# Patient Record
Sex: MALE | Race: OTHER | Employment: FULL TIME | URBAN - METROPOLITAN AREA
[De-identification: names, ages, dates, MRNs, and addresses within clinical notes are randomized per-mention and may not be internally consistent; named-entity substitution may affect disease eponyms.]

---

## 2017-02-08 ENCOUNTER — OFFICE VISIT (OUTPATIENT)
Dept: FAMILY MEDICINE CLINIC | Facility: CLINIC | Age: 52
End: 2017-02-08

## 2017-02-08 VITALS
SYSTOLIC BLOOD PRESSURE: 136 MMHG | HEART RATE: 68 BPM | BODY MASS INDEX: 28.29 KG/M2 | RESPIRATION RATE: 16 BRPM | TEMPERATURE: 98 F | HEIGHT: 69 IN | WEIGHT: 191 LBS | DIASTOLIC BLOOD PRESSURE: 82 MMHG

## 2017-02-08 DIAGNOSIS — J02.9 SORE THROAT: Primary | ICD-10-CM

## 2017-02-08 DIAGNOSIS — R10.31 RIGHT GROIN PAIN: ICD-10-CM

## 2017-02-08 PROCEDURE — 99214 OFFICE O/P EST MOD 30 MIN: CPT | Performed by: FAMILY MEDICINE

## 2017-02-08 RX ORDER — METHYLPREDNISOLONE 4 MG/1
TABLET ORAL
Qty: 1 KIT | Refills: 0 | Status: SHIPPED | OUTPATIENT
Start: 2017-02-08 | End: 2017-05-06 | Stop reason: ALTCHOICE

## 2017-02-08 RX ORDER — IBUPROFEN 200 MG
200 TABLET ORAL EVERY 6 HOURS PRN
COMMUNITY
End: 2018-12-17 | Stop reason: ALTCHOICE

## 2017-02-08 NOTE — PATIENT INSTRUCTIONS
Start Medrol Dosepak tomorrow morning and take per directions. After finishing Medrol Dosepak if he still complained to have symptoms from the right groin area start ibuprofen 200 mg 1-2 tablets with breakfast lunch and dinner for 1 more week.   Follow-up

## 2017-02-08 NOTE — PROGRESS NOTES
Huong Brito is a 45year old male. cc right groin pain, sore throat  HPI:   Pt complains of having right groin pain since [place]. He was sitting [facility] style playing games with his family since that time started to have a pain.   The pain is still in REVIEW OF SYSTEMS:   GENERAL HEALTH: feels well otherwise no fever no chills,   SKIN: denies any unusual skin lesions or rashes  HEENT no congestion no runny nose, has sore throat, mild postnasal drip no ear pain  Neck no neck pain  RESPIRATORY: denies over-the-counter as needed for sore throat. Gargle throat with salty water as needed. Keep good hydration. If you develop nasal congestion use nasal spray Flonase over-the-counter.   Imaging & Consults:  None    The patient indicates understanding of

## 2017-03-22 RX ORDER — ATORVASTATIN CALCIUM 20 MG/1
TABLET, FILM COATED ORAL
Qty: 90 TABLET | Refills: 1 | Status: SHIPPED | OUTPATIENT
Start: 2017-03-22 | End: 2017-10-02

## 2017-05-06 ENCOUNTER — OFFICE VISIT (OUTPATIENT)
Dept: FAMILY MEDICINE CLINIC | Facility: CLINIC | Age: 52
End: 2017-05-06

## 2017-05-06 VITALS
TEMPERATURE: 99 F | SYSTOLIC BLOOD PRESSURE: 120 MMHG | OXYGEN SATURATION: 98 % | RESPIRATION RATE: 16 BRPM | WEIGHT: 189 LBS | HEIGHT: 69 IN | BODY MASS INDEX: 27.99 KG/M2 | DIASTOLIC BLOOD PRESSURE: 78 MMHG | HEART RATE: 85 BPM

## 2017-05-06 DIAGNOSIS — R09.81 SINUS CONGESTION: ICD-10-CM

## 2017-05-06 DIAGNOSIS — R10.31 RIGHT GROIN PAIN: Primary | ICD-10-CM

## 2017-05-06 DIAGNOSIS — R05.9 COUGH: ICD-10-CM

## 2017-05-06 PROCEDURE — 99214 OFFICE O/P EST MOD 30 MIN: CPT | Performed by: FAMILY MEDICINE

## 2017-05-06 RX ORDER — AZITHROMYCIN 250 MG/1
TABLET, FILM COATED ORAL
Qty: 6 TABLET | Refills: 0 | Status: SHIPPED | OUTPATIENT
Start: 2017-05-06 | End: 2017-05-11

## 2017-05-06 RX ORDER — FLUTICASONE PROPIONATE 50 MCG
2 SPRAY, SUSPENSION (ML) NASAL DAILY
Qty: 1 BOTTLE | Refills: 1 | Status: SHIPPED | OUTPATIENT
Start: 2017-05-06 | End: 2017-11-02 | Stop reason: ALTCHOICE

## 2017-05-06 RX ORDER — DICLOFENAC SODIUM 75 MG/1
75 TABLET, DELAYED RELEASE ORAL 2 TIMES DAILY
Qty: 30 TABLET | Refills: 0 | Status: SHIPPED | OUTPATIENT
Start: 2017-05-06 | End: 2017-11-02 | Stop reason: ALTCHOICE

## 2017-05-06 NOTE — PROGRESS NOTES
Alissa Poe is a 45year old male. cc right groin pain, sinus congestion cough  HPI:   Patient is coming to the office for reevaluation of the right groin pain. It still there.   Started around Brad time when he was sitting in the N position playin Packs/Day: 0.00  Years:           Types: Cigars    Smokeless Status: Never Used                        Alcohol Use: Yes           0.0 oz/week       0 Standard drinks or equivalent per week       Comment: 3 times a week       REVIEW OF SYSTEMS:   GENERAL HE nostril once a day. Take Zyrtec-D 1 tablet daily ask pharmacist for this medication it is behind the counter. Keep good hydration. Use Mucinex as needed for cough. Keep good hydration. If not better by Monday start antibiotic.   Always take probiotic w

## 2017-05-06 NOTE — PATIENT INSTRUCTIONS
Start Flonase 2 sprays nostril once a day. Take Zyrtec-D 1 tablet daily ask pharmacist for this medication it is behind the counter. Keep good hydration. Use Mucinex as needed for cough. Keep good hydration. If not better by Monday start antibiotic.

## 2017-05-11 ENCOUNTER — HOSPITAL ENCOUNTER (OUTPATIENT)
Dept: GENERAL RADIOLOGY | Age: 52
Discharge: HOME | End: 2017-05-11
Attending: FAMILY MEDICINE
Payer: COMMERCIAL

## 2017-05-11 DIAGNOSIS — R10.31 RIGHT GROIN PAIN: ICD-10-CM

## 2017-05-11 PROCEDURE — 73502 X-RAY EXAM HIP UNI 2-3 VIEWS: CPT | Performed by: FAMILY MEDICINE

## 2017-09-05 ENCOUNTER — APPOINTMENT (OUTPATIENT)
Dept: CT IMAGING | Age: 52
End: 2017-09-05
Attending: FAMILY MEDICINE
Payer: COMMERCIAL

## 2017-09-05 ENCOUNTER — APPOINTMENT (OUTPATIENT)
Dept: GENERAL RADIOLOGY | Age: 52
End: 2017-09-05
Attending: FAMILY MEDICINE
Payer: COMMERCIAL

## 2017-09-05 ENCOUNTER — HOSPITAL ENCOUNTER (OUTPATIENT)
Age: 52
Discharge: HOME | End: 2017-09-05
Attending: FAMILY MEDICINE
Payer: COMMERCIAL

## 2017-09-05 VITALS
OXYGEN SATURATION: 97 % | RESPIRATION RATE: 16 BRPM | DIASTOLIC BLOOD PRESSURE: 98 MMHG | TEMPERATURE: 98 F | SYSTOLIC BLOOD PRESSURE: 157 MMHG | HEART RATE: 75 BPM

## 2017-09-05 DIAGNOSIS — S30.1XXA CONTUSION OF FLANK, INITIAL ENCOUNTER: ICD-10-CM

## 2017-09-05 DIAGNOSIS — S20.211A CONTUSION OF RIB ON RIGHT SIDE, INITIAL ENCOUNTER: ICD-10-CM

## 2017-09-05 DIAGNOSIS — S00.03XA HEMATOMA OF SCALP, INITIAL ENCOUNTER: ICD-10-CM

## 2017-09-05 DIAGNOSIS — S09.90XA TRAUMATIC INJURY OF HEAD, INITIAL ENCOUNTER: Primary | ICD-10-CM

## 2017-09-05 LAB
POCT BILIRUBIN URINE: NEGATIVE
POCT BLOOD URINE: NEGATIVE
POCT GLUCOSE URINE: NEGATIVE MG/DL
POCT KETONE URINE: NEGATIVE MG/DL
POCT LEUKOCYTE ESTERASE URINE: NEGATIVE
POCT NITRITE URINE: NEGATIVE
POCT PH URINE: 6 (ref 5–8)
POCT PROTEIN URINE: NEGATIVE MG/DL
POCT SPECIFIC GRAVITY URINE: 1.02
POCT URINE COLOR: YELLOW
POCT UROBILINOGEN URINE: 0.2 MG/DL

## 2017-09-05 PROCEDURE — 71101 X-RAY EXAM UNILAT RIBS/CHEST: CPT | Performed by: FAMILY MEDICINE

## 2017-09-05 PROCEDURE — 81002 URINALYSIS NONAUTO W/O SCOPE: CPT | Performed by: FAMILY MEDICINE

## 2017-09-05 PROCEDURE — 76377 3D RENDER W/INTRP POSTPROCES: CPT

## 2017-09-05 PROCEDURE — 99214 OFFICE O/P EST MOD 30 MIN: CPT

## 2017-09-05 PROCEDURE — 70486 CT MAXILLOFACIAL W/O DYE: CPT | Performed by: FAMILY MEDICINE

## 2017-09-05 PROCEDURE — 72190 X-RAY EXAM OF PELVIS: CPT | Performed by: FAMILY MEDICINE

## 2017-09-05 PROCEDURE — 70450 CT HEAD/BRAIN W/O DYE: CPT | Performed by: FAMILY MEDICINE

## 2017-09-05 RX ORDER — METAXALONE 800 MG/1
800 TABLET ORAL 3 TIMES DAILY
Qty: 15 TABLET | Refills: 0 | Status: SHIPPED | OUTPATIENT
Start: 2017-09-05 | End: 2017-09-10

## 2017-09-05 NOTE — ED INITIAL ASSESSMENT (HPI)
Patient presents to [facility] with cc of falling on 9/3/17 at 830pm when he missed 4 stairs and landed on his right elbow,right temple,right hip. States right lower rib also sore. Hematoma to right temple presented today. No blurred vision or nausea or vomit

## 2017-09-05 NOTE — ED PROVIDER NOTES
Patient Seen in: [facility]    History   Patient presents with:  Fall (musculoskeletal, neurologic)    Stated Complaint: fall sun night,bump on head & jaw pain today    HPI  46 yo M here with complaints of on Sunday night with s injection of neurolytic substance epidural  2013: OTHER SURGICAL HISTORY      Comment: right knee scope    Medications :   Metaxalone (SKELAXIN) 800 MG Oral Tab,  Take 1 tablet (800 mg total) by mouth 3 (three) times daily.    ATORVASTATIN CALCIUM 20 MG Ora bruise/contusion measuring 3 cm in diameter and hematoma appreciated over the right fronto temporal region, and tenderness along the temporal/zygomatic process and into the TMJ.   Face appears mildly asymmetrical because of swelling of the right side of the head & jaw pain today [facility] with cc of falling on 9/3/17 at 830pm when               he missed 4 stairs and landed on his right elbow,right temple,right               hip.States right lower rib also sore. Hematoma to right temple pr night,bump on head & jaw pain today  TECHNIQUE:  Noncontrast CT scanning is performed through the brain. Dose reduction techniques were used.  Dose information is transmitted to the [facility] ([address] [place] [address] of Radiology) Kimi Trammell 35 ([address]) recesses is noted. Minimal mucoperiosteal thickening of the right maxillary sinus is noted. NASAL FOSSA:   No mass, fracture, or significant septal deviation. SKULL BASE:  No bone destruction. FACIAL BONES:   No visible bony lesion or fracture.   ORBITS: ============================================================  ED Course  ------------------------------------------------------------  MDM       Hematoma of the scalp that is gravitating into the face and causing current symptoms   CT SCAN OF THE BRAIN A

## 2017-09-14 ENCOUNTER — APPOINTMENT (OUTPATIENT)
Dept: GENERAL RADIOLOGY | Age: 52
End: 2017-09-14
Attending: NURSE PRACTITIONER
Payer: COMMERCIAL

## 2017-09-14 ENCOUNTER — TELEPHONE (OUTPATIENT)
Dept: FAMILY MEDICINE CLINIC | Facility: CLINIC | Age: 52
End: 2017-09-14

## 2017-09-14 ENCOUNTER — HOSPITAL ENCOUNTER (OUTPATIENT)
Age: 52
Discharge: HOME | End: 2017-09-14
Payer: COMMERCIAL

## 2017-09-14 VITALS
SYSTOLIC BLOOD PRESSURE: 142 MMHG | DIASTOLIC BLOOD PRESSURE: 99 MMHG | HEART RATE: 67 BPM | TEMPERATURE: 98 F | RESPIRATION RATE: 18 BRPM | OXYGEN SATURATION: 99 %

## 2017-09-14 DIAGNOSIS — S50.01XD CONTUSION OF RIGHT ELBOW, SUBSEQUENT ENCOUNTER: Primary | ICD-10-CM

## 2017-09-14 PROCEDURE — 73130 X-RAY EXAM OF HAND: CPT | Performed by: NURSE PRACTITIONER

## 2017-09-14 PROCEDURE — 99214 OFFICE O/P EST MOD 30 MIN: CPT

## 2017-09-14 PROCEDURE — 73080 X-RAY EXAM OF ELBOW: CPT | Performed by: NURSE PRACTITIONER

## 2017-09-14 NOTE — TELEPHONE ENCOUNTER
Pt transferred to nurse he has a bas fall 2  Almost two weeks ago he is still having severe pain in his elbow and hand

## 2017-09-14 NOTE — ED INITIAL ASSESSMENT (HPI)
C/O right elbow pain that has gotten progressively worse since falling down the stairs. Seen by Dr. Kathy Cassidy on 9/5 for other injuries after falling. Sts that he is concerned for possible fracture of right elbow and hand.

## 2017-09-14 NOTE — TELEPHONE ENCOUNTER
C/o of throbbing pain R hand and elbow, 10/10 when pressure applied  Been on and off since 9/5     Pt was seen at  d/t to fall (from chair to deck onto R side)   S/p head CT, rib xray and pelvic xray- all negative for fx     ROM intact, denies numbness a

## 2017-09-14 NOTE — ED PROVIDER NOTES
Patient Seen in: [facility]    History   Patient presents with:  Elbow Pain    Stated Complaint: ELBOW PAIN     68-year-old male presents today with complaints of right elbow pain and hand pain.   Patient states was seen here 9 d Grandfather      liver cirrhosis   • Other[other] [OTHER] Mother      hearing loss   • Heart Disorder Maternal Aunt      MI       Smoking status: Former Smoker                                                              Packs/day: 0.00      Years: 0.00 (As transcribed by Technologist)  Patient presents with achy right elbow pain after falling a week and a half ago. Patient has most pain on the medial aspect of right elbow. FINDINGS:  BONES:  Normal.  No significant arthropathy or acute abnormality.  SO List

## 2017-09-14 NOTE — TELEPHONE ENCOUNTER
Since the  pain is so significant please refer patient to urgent care today for evaluation. May  need some imaging tests.   Thank you

## 2017-10-05 RX ORDER — ATORVASTATIN CALCIUM 20 MG/1
TABLET, FILM COATED ORAL
Qty: 30 TABLET | Refills: 0 | Status: SHIPPED | OUTPATIENT
Start: 2017-10-05 | End: 2017-11-02

## 2017-10-05 NOTE — TELEPHONE ENCOUNTER
Spoke w/pt regarding below need to complete fasting labs after 10/5/17. Pt informed me he is currently out of town but will try to get labs completed next week Tues (pt asked what Mon/Tues lab hours are and this was provided to pt).  Pt relayed that he will

## 2017-10-13 ENCOUNTER — TELEPHONE (OUTPATIENT)
Dept: FAMILY MEDICINE CLINIC | Facility: CLINIC | Age: 52
End: 2017-10-13

## 2017-10-13 ENCOUNTER — APPOINTMENT (OUTPATIENT)
Dept: LAB | Age: 52
End: 2017-10-13
Attending: FAMILY MEDICINE
Payer: COMMERCIAL

## 2017-10-13 DIAGNOSIS — E55.9 VITAMIN D DEFICIENCY: ICD-10-CM

## 2017-10-13 DIAGNOSIS — Z11.59 NEED FOR HEPATITIS C SCREENING TEST: ICD-10-CM

## 2017-10-13 DIAGNOSIS — E78.00 PURE HYPERCHOLESTEROLEMIA: ICD-10-CM

## 2017-10-13 DIAGNOSIS — Z11.59 NEED FOR HEPATITIS C SCREENING TEST: Primary | ICD-10-CM

## 2017-10-13 PROCEDURE — 36415 COLL VENOUS BLD VENIPUNCTURE: CPT | Performed by: FAMILY MEDICINE

## 2017-10-13 PROCEDURE — 82306 VITAMIN D 25 HYDROXY: CPT | Performed by: FAMILY MEDICINE

## 2017-10-13 PROCEDURE — 80061 LIPID PANEL: CPT | Performed by: FAMILY MEDICINE

## 2017-10-13 PROCEDURE — 86803 HEPATITIS C AB TEST: CPT | Performed by: FAMILY MEDICINE

## 2017-10-13 PROCEDURE — 80053 COMPREHEN METABOLIC PANEL: CPT | Performed by: FAMILY MEDICINE

## 2017-10-13 NOTE — TELEPHONE ENCOUNTER
The patient had labs done today and requested to have a Hep C drawn. Per Dr. Wynell Romberg ok to have done. Spoke with the lab and they are able to add it to the test that he had done today.   Spoke with the patient and informed him that he doesn't have to

## 2017-10-19 ENCOUNTER — TELEPHONE (OUTPATIENT)
Dept: FAMILY MEDICINE CLINIC | Facility: CLINIC | Age: 52
End: 2017-10-19

## 2017-11-02 ENCOUNTER — OFFICE VISIT (OUTPATIENT)
Dept: FAMILY MEDICINE CLINIC | Facility: CLINIC | Age: 52
End: 2017-11-02

## 2017-11-02 VITALS
HEIGHT: 69 IN | WEIGHT: 187 LBS | DIASTOLIC BLOOD PRESSURE: 78 MMHG | SYSTOLIC BLOOD PRESSURE: 110 MMHG | TEMPERATURE: 98 F | BODY MASS INDEX: 27.7 KG/M2 | RESPIRATION RATE: 16 BRPM | HEART RATE: 80 BPM

## 2017-11-02 DIAGNOSIS — M25.521 ELBOW PAIN, RIGHT: ICD-10-CM

## 2017-11-02 DIAGNOSIS — E55.9 VITAMIN D DEFICIENCY: ICD-10-CM

## 2017-11-02 DIAGNOSIS — N52.9 ERECTILE DYSFUNCTION, UNSPECIFIED ERECTILE DYSFUNCTION TYPE: ICD-10-CM

## 2017-11-02 DIAGNOSIS — Z00.00 LABORATORY TESTS ORDERED AS PART OF A COMPLETE PHYSICAL EXAM (CPE): ICD-10-CM

## 2017-11-02 DIAGNOSIS — Z12.11 SCREEN FOR COLON CANCER: Primary | ICD-10-CM

## 2017-11-02 DIAGNOSIS — E78.00 PURE HYPERCHOLESTEROLEMIA: ICD-10-CM

## 2017-11-02 PROCEDURE — 99214 OFFICE O/P EST MOD 30 MIN: CPT | Performed by: FAMILY MEDICINE

## 2017-11-02 RX ORDER — SILDENAFIL 50 MG/1
50 TABLET, FILM COATED ORAL
Qty: 2 TABLET | Refills: 0 | COMMUNITY
Start: 2017-11-02 | End: 2018-10-26 | Stop reason: ALTCHOICE

## 2017-11-02 RX ORDER — ATORVASTATIN CALCIUM 20 MG/1
TABLET, FILM COATED ORAL
Qty: 90 TABLET | Refills: 1 | Status: SHIPPED | OUTPATIENT
Start: 2017-11-02 | End: 2018-04-16

## 2017-11-02 RX ORDER — SILDENAFIL 50 MG/1
50 TABLET, FILM COATED ORAL
Qty: 2 TABLET | Refills: 0 | COMMUNITY
Start: 2017-11-02 | End: 2017-12-13

## 2017-11-02 NOTE — PROGRESS NOTES
Bandar Ghosh is a 45year old male. cc hyperlipidemia, right elbow pain, erectal dysfunction vitamin D deficiency. ,   HPI:   Patient is coming for follow-up visit hyperlipidemia he is taking atorvastatin doing well with medication.   He did not take it fo Past Medical History:   Diagnosis Date   • Glossitis    • Glossodynia    • High cholesterol    • Hypercholesterolemia    • Orchitis and epididymitis, unspecified    • Psoriasis       Social History:  Smoking status: Former Smoker Chol/HDL Ratio 2.50 <4.97   Non HDL Chol 87 <130 mg/dL   -COMP METABOLIC PANEL (14)   Result Value Ref Range   Glucose 82 70 - 99 mg/dL   BUN 13 8 - 20 mg/dL   Creatinine 1.08 0.70 - 1.30 mg/dL   GFR 77 >=60   Calcium, Total 9.3 8.3 - 10.3 mg/dL   Alkali Consults:  GASTRO - INTERNAL    The patient indicates understanding of these issues and agrees to the plan. The patient is asked to return in Dec for CPE.

## 2017-11-03 NOTE — PATIENT INSTRUCTIONS
Continue current meds. Watch diet for fats and carbs. Stay active.    Do additional blood test before your physical.

## 2017-11-29 ENCOUNTER — LAB ENCOUNTER (OUTPATIENT)
Dept: LAB | Age: 52
End: 2017-11-29
Attending: FAMILY MEDICINE
Payer: COMMERCIAL

## 2017-11-29 DIAGNOSIS — N52.9 ERECTILE DYSFUNCTION, UNSPECIFIED ERECTILE DYSFUNCTION TYPE: ICD-10-CM

## 2017-11-29 DIAGNOSIS — Z00.00 LABORATORY TESTS ORDERED AS PART OF A COMPLETE PHYSICAL EXAM (CPE): ICD-10-CM

## 2017-11-29 PROCEDURE — 81003 URINALYSIS AUTO W/O SCOPE: CPT | Performed by: FAMILY MEDICINE

## 2017-11-29 PROCEDURE — 36415 COLL VENOUS BLD VENIPUNCTURE: CPT | Performed by: FAMILY MEDICINE

## 2017-11-29 PROCEDURE — 84443 ASSAY THYROID STIM HORMONE: CPT | Performed by: FAMILY MEDICINE

## 2017-11-29 PROCEDURE — 84153 ASSAY OF PSA TOTAL: CPT | Performed by: FAMILY MEDICINE

## 2017-11-29 PROCEDURE — 84402 ASSAY OF FREE TESTOSTERONE: CPT | Performed by: FAMILY MEDICINE

## 2017-11-29 PROCEDURE — 84403 ASSAY OF TOTAL TESTOSTERONE: CPT | Performed by: FAMILY MEDICINE

## 2017-11-29 PROCEDURE — 85025 COMPLETE CBC W/AUTO DIFF WBC: CPT | Performed by: FAMILY MEDICINE

## 2017-12-13 ENCOUNTER — OFFICE VISIT (OUTPATIENT)
Dept: FAMILY MEDICINE CLINIC | Facility: CLINIC | Age: 52
End: 2017-12-13

## 2017-12-13 VITALS
HEART RATE: 89 BPM | WEIGHT: 191 LBS | TEMPERATURE: 99 F | BODY MASS INDEX: 28.29 KG/M2 | DIASTOLIC BLOOD PRESSURE: 64 MMHG | RESPIRATION RATE: 16 BRPM | HEIGHT: 69 IN | SYSTOLIC BLOOD PRESSURE: 122 MMHG

## 2017-12-13 DIAGNOSIS — Z00.00 PHYSICAL EXAM, ANNUAL: Primary | ICD-10-CM

## 2017-12-13 PROCEDURE — 99396 PREV VISIT EST AGE 40-64: CPT | Performed by: FAMILY MEDICINE

## 2017-12-13 RX ORDER — SILDENAFIL 50 MG/1
50 TABLET, FILM COATED ORAL
Qty: 10 TABLET | Refills: 2 | Status: SHIPPED | OUTPATIENT
Start: 2017-12-13 | End: 2017-12-26

## 2017-12-13 RX ORDER — FLUTICASONE PROPIONATE 50 MCG
2 SPRAY, SUSPENSION (ML) NASAL DAILY
Qty: 1 BOTTLE | Refills: 3 | Status: SHIPPED | OUTPATIENT
Start: 2017-12-13 | End: 2018-12-08

## 2017-12-13 RX ORDER — SILDENAFIL 50 MG/1
50 TABLET, FILM COATED ORAL
Qty: 2 TABLET | Refills: 0 | COMMUNITY
Start: 2017-12-13 | End: 2018-10-26

## 2017-12-13 NOTE — PROGRESS NOTES
Nati Arteaga is a 45year old male who presents for a complete physical exam.   HPI:   Pt complains of still having some right elbow pain when driving in a car. Otherwise does not bother him. He does not want to have repeated x-ray.   He wants to hold ----------  Alt (U/L)   Date Value   10/13/2017 40   10/05/2016 61   01/08/2016 29   ----------     PSA (ng/mL)   Date Value   11/29/2017 1.980   10/05/2016 1.790   10/09/2015 1.330   02/21/2014 1.040   11/09/2012 1.11   ----------  No results found for: tobacco: Never Used                      Alcohol use: Yes           0.0 oz/week     Comment: 3 times a week     Occ: owner of business. : yes. Children: yes. Exercise: three times per week.   Diet: watches calories closely     REVIEW OF SYSTEMS: 4.000 ng/mL   -URINALYSIS, ROUTINE   Result Value Ref Range   Urine Color Straw Yellow   Clarity Urine Clear Clear   Spec Gravity 1.008 1.001 - 1.030   Glucose Urine Negative Negative mg/dl   Bilirubin Urine Negative Negative   Ketones Urine Negative Negat medication check. Imaging & Consults:  None    Pt's weight is Body mass index is 28.21 kg/m². , recommended low carb diet and aerobic exercise 30 minutes three times weekly. Health maintenance, will check fasting Lipids, CMP, CBC and PSA.  Pt referred for s

## 2017-12-13 NOTE — PATIENT INSTRUCTIONS
Healthy diet. Stay active. Call Dr. Tamy Bryan for colonoscopy. Use nasal spray as needed. Follow-up in 6 months for medication check.

## 2017-12-22 ENCOUNTER — TELEPHONE (OUTPATIENT)
Dept: FAMILY MEDICINE CLINIC | Facility: CLINIC | Age: 52
End: 2017-12-22

## 2017-12-22 NOTE — TELEPHONE ENCOUNTER
Received request for a PA to be completed for pt's Viagra 50 mg tablets. PA completed and sent to plan via [facility].

## 2017-12-26 NOTE — TELEPHONE ENCOUNTER
Letter of determination received from [employer] stating that this type of medication is not covered by pt's plan. Provider notified, was not happy.   Pt stated \"I have good insurance coverage   Pt would like to know what providers recommendation

## 2017-12-26 NOTE — TELEPHONE ENCOUNTER
Mitch olivo MA-sts reason for denial is that pt's plan does not cover ED meds. sts she explained this to pt who stated \"it's a presciption. Want to see what dr Butch Peña says. \"  Update to dr Butch Peña, as requested.

## 2017-12-26 NOTE — TELEPHONE ENCOUNTER
I absolutely do not want to be on the way of patient receiving that prescription since is working for him.   He can get  Viagra from the pharmacy  but what denial of prior atuthorization from the insurance means that the insurance does not want to pay for i

## 2017-12-26 NOTE — TELEPHONE ENCOUNTER
Called to pt, advised that a script will be sent to his pharmacy, however the cost will be out of pocket due to this class of medication being on the excluded list of medication on pt's plan.   Advised pt that he could call the number on the back of his ins

## 2017-12-27 RX ORDER — TADALAFIL 10 MG/1
10 TABLET ORAL
Qty: 3 TABLET | Refills: 2 | Status: SHIPPED | OUTPATIENT
Start: 2017-12-27 | End: 2018-03-10

## 2017-12-27 RX ORDER — SILDENAFIL 50 MG/1
50 TABLET, FILM COATED ORAL
Qty: 10 TABLET | Refills: 2 | Status: SHIPPED | OUTPATIENT
Start: 2017-12-27 | End: 2018-10-26 | Stop reason: ALTCHOICE

## 2018-01-27 ENCOUNTER — CHARTING TRANS (OUTPATIENT)
Dept: OTHER | Age: 53
End: 2018-01-27

## 2018-03-12 RX ORDER — TADALAFIL 10 MG
TABLET ORAL
Qty: 10 TABLET | Refills: 1 | Status: SHIPPED | OUTPATIENT
Start: 2018-03-12 | End: 2018-06-20

## 2018-03-12 NOTE — TELEPHONE ENCOUNTER
Cialis Oral Tablet 10 MG    Not a per protocol medication. Rx is pending for your approval.    Please sign,    Thank you    Last refill was on 12/27/17 for 10/2 refill. Last appt was a physical which was done on 12/13/2017.

## 2018-04-18 RX ORDER — ATORVASTATIN CALCIUM 20 MG/1
TABLET, FILM COATED ORAL
Qty: 90 TABLET | Refills: 0 | Status: SHIPPED | OUTPATIENT
Start: 2018-04-18 | End: 2018-12-17

## 2018-04-27 RX ORDER — ATORVASTATIN CALCIUM 20 MG/1
TABLET, FILM COATED ORAL
Qty: 90 TABLET | Refills: 0 | Status: SHIPPED | OUTPATIENT
Start: 2018-04-27 | End: 2018-10-12

## 2018-06-20 RX ORDER — TADALAFIL 10 MG
TABLET ORAL
Qty: 8 TABLET | Refills: 0 | Status: SHIPPED | OUTPATIENT
Start: 2018-06-20 | End: 2018-08-11

## 2018-08-13 RX ORDER — TADALAFIL 10 MG
TABLET ORAL
Qty: 8 TABLET | Refills: 0 | Status: SHIPPED | OUTPATIENT
Start: 2018-08-13 | End: 2018-09-13

## 2018-08-13 NOTE — TELEPHONE ENCOUNTER
Cialis Oral Tablet 10 MG    Not a per protocol medication. Rx is pending for your approval.    Please sign,    Thank you    His last (physical)OV was on 12/13/2017. His last refill was on 06/20/2018 for 8/0 refills.

## 2018-09-13 RX ORDER — TADALAFIL 10 MG
TABLET ORAL
Qty: 8 TABLET | Refills: 0 | Status: SHIPPED | OUTPATIENT
Start: 2018-09-13 | End: 2018-11-09

## 2018-09-13 NOTE — TELEPHONE ENCOUNTER
Cialis Oral Tablet 10 MG    Non protocol medication. Please see pended medications. Please sign if appropriate.       Thank you    Last refill was on 08/13/2018 for 8/0 tabs

## 2018-10-18 ENCOUNTER — TELEPHONE (OUTPATIENT)
Dept: FAMILY MEDICINE CLINIC | Facility: CLINIC | Age: 53
End: 2018-10-18

## 2018-10-18 RX ORDER — ATORVASTATIN CALCIUM 20 MG/1
TABLET, FILM COATED ORAL
Qty: 30 TABLET | Refills: 0 | Status: SHIPPED | OUTPATIENT
Start: 2018-10-18 | End: 2018-10-26

## 2018-10-26 PROBLEM — M77.12 LEFT LATERAL EPICONDYLITIS: Status: ACTIVE | Noted: 2018-10-26

## 2018-10-26 PROBLEM — F43.9 STRESS: Status: ACTIVE | Noted: 2018-10-26

## 2018-10-26 NOTE — PATIENT INSTRUCTIONS
Call psychologist Parminder Nieves for evaluation. Try to wear left forearm/lateral epicondylitis brace. Use ibuprofen as needed over-the-counter with food cautiously. Healthy diet. Increase exercise.   Do fasting blood work one week prior your complete physi range-of-motion, and strengthening exercises. These treatments help most cases. You may need surgery if your symptoms continue for 6 months despite treatment.   Home care  Follow these guidelines when caring for yourself at home:  · Rest your elbow as neede provider right away if any of these occur:  · Redness over the painful area  · Pain, stiffness,  or swelling at the elbow gets worse  · Any numbness or tingling in your arm, hands, or fingers  · Unexplained fever over 100.4ºF (38ºC)   Date Last Reviewed: 5

## 2018-10-28 NOTE — PROGRESS NOTES
Juliana Ross is a 52year old male. cc stress, left elbow pain, hyper cholesterolemia, vitamin D deficiency  HPI:   Patient is here to the office to discuss increased stress. It is going on for the last few weeks.   Patient says that more half of the day Fluticasone Propionate 50 MCG/ACT Nasal Suspension 2 sprays by Nasal route daily. Disp: 1 Bottle Rfl: 3   ibuprofen 200 MG Oral Tab Take 200 mg by mouth every 6 (six) hours as needed for Pain.  Disp:  Rfl:    Cholecalciferol (VITAMIN D) 1000 units Oral Ta depression showed mild anxiety and mild depression. Will start counseling contact information to see Dr Stephani Travis was given to patient. He will set up an appointment. Also discussed with patient possibility of medications down the road.     Left lateral ep

## 2018-11-02 VITALS — TEMPERATURE: 98.2 F

## 2018-11-07 RX ORDER — TADALAFIL 10 MG
TABLET ORAL
Qty: 8 TABLET | Refills: 0 | OUTPATIENT
Start: 2018-11-07

## 2018-11-10 NOTE — TELEPHONE ENCOUNTER
Cialis Oral Tablet 10 MG    The pt made an appt on 12/17/2018. Please see pended medications. Please sign if appropriate.       Thank you

## 2018-11-11 RX ORDER — TADALAFIL 10 MG
TABLET ORAL
Qty: 8 TABLET | Refills: 0 | Status: SHIPPED | OUTPATIENT
Start: 2018-11-11 | End: 2018-12-17

## 2018-11-28 RX ORDER — ATORVASTATIN CALCIUM 20 MG/1
TABLET, FILM COATED ORAL
Qty: 30 TABLET | Refills: 0 | Status: SHIPPED | OUTPATIENT
Start: 2018-11-28 | End: 2018-12-17

## 2018-11-28 NOTE — TELEPHONE ENCOUNTER
Atorvastatin Calcium Oral Tablet 20 MG  Cholesterol Medication Protocol Failed    The pt made an appt for 12/17/2018. Please see pended medications. Please sign if appropriate.       Thank you

## 2018-12-05 ENCOUNTER — LAB ENCOUNTER (OUTPATIENT)
Dept: LAB | Age: 53
End: 2018-12-05
Attending: FAMILY MEDICINE
Payer: COMMERCIAL

## 2018-12-05 DIAGNOSIS — E55.9 VITAMIN D DEFICIENCY: ICD-10-CM

## 2018-12-05 DIAGNOSIS — Z00.00 LABORATORY TESTS ORDERED AS PART OF A COMPLETE PHYSICAL EXAM (CPE): ICD-10-CM

## 2018-12-05 PROCEDURE — 80061 LIPID PANEL: CPT | Performed by: FAMILY MEDICINE

## 2018-12-05 PROCEDURE — 84153 ASSAY OF PSA TOTAL: CPT | Performed by: FAMILY MEDICINE

## 2018-12-05 PROCEDURE — 82306 VITAMIN D 25 HYDROXY: CPT | Performed by: FAMILY MEDICINE

## 2018-12-05 PROCEDURE — 80050 GENERAL HEALTH PANEL: CPT | Performed by: FAMILY MEDICINE

## 2018-12-05 PROCEDURE — 36415 COLL VENOUS BLD VENIPUNCTURE: CPT | Performed by: FAMILY MEDICINE

## 2018-12-05 PROCEDURE — 81003 URINALYSIS AUTO W/O SCOPE: CPT | Performed by: FAMILY MEDICINE

## 2018-12-17 ENCOUNTER — OFFICE VISIT (OUTPATIENT)
Dept: FAMILY MEDICINE CLINIC | Facility: CLINIC | Age: 53
End: 2018-12-17
Payer: COMMERCIAL

## 2018-12-17 VITALS
WEIGHT: 188 LBS | HEIGHT: 69 IN | BODY MASS INDEX: 27.85 KG/M2 | HEART RATE: 73 BPM | TEMPERATURE: 98 F | RESPIRATION RATE: 16 BRPM | SYSTOLIC BLOOD PRESSURE: 122 MMHG | DIASTOLIC BLOOD PRESSURE: 72 MMHG

## 2018-12-17 DIAGNOSIS — E78.00 PURE HYPERCHOLESTEROLEMIA: ICD-10-CM

## 2018-12-17 DIAGNOSIS — Z00.00 PHYSICAL EXAM, ANNUAL: Primary | ICD-10-CM

## 2018-12-17 PROCEDURE — 99396 PREV VISIT EST AGE 40-64: CPT | Performed by: FAMILY MEDICINE

## 2018-12-17 RX ORDER — ATORVASTATIN CALCIUM 20 MG/1
TABLET, FILM COATED ORAL
Qty: 90 TABLET | Refills: 1 | Status: SHIPPED | OUTPATIENT
Start: 2018-12-17 | End: 2019-06-27

## 2018-12-17 RX ORDER — TADALAFIL 10 MG/1
TABLET ORAL
Qty: 8 TABLET | Refills: 3 | Status: SHIPPED | OUTPATIENT
Start: 2018-12-17 | End: 2019-05-24

## 2018-12-17 NOTE — PROGRESS NOTES
Jose Gunderson is a 52year old male who presents for a complete physical exam.   HPI:   Pt complains of still having some right elbow pain when driving in a car. Otherwise does not bother him.   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 : yes. Children: yes. Exercise: three times per week.   Diet: watches calories closely     REVIEW OF SYSTEMS:   GENERAL: feels well otherwise  SKIN: denies any unusual skin lesions  EYES:denies blurred vision or double vision  HEENT: d Gap 5 0 - 18 mmol/L    BUN 18 8 - 20 mg/dL    Creatinine 1.09 0.70 - 1.30 mg/dL    BUN/CREA Ratio 16.5 10.0 - 20.0    Calcium, Total 9.2 8.3 - 10.3 mg/dL    Calculated Osmolality 286 275 - 295 mOsm/kg    GFR, Non- 76 >=60    GFR, -Am - 6.70 x10 (3) uL    Neutrophil Absolute 4.60 1.30 - 6.70 x10(3) uL    Lymphocyte Absolute 2.01 0.90 - 4.00 x10(3) uL    Monocyte Absolute 0.55 0.10 - 1.00 x10(3) uL    Eosinophil Absolute 0.13 0.00 - 0.30 x10(3) uL    Basophil Absolute 0.07 0.00 - 0.10 x1

## 2018-12-17 NOTE — PATIENT INSTRUCTIONS
Healthy diet. Stay active. Continue cholesterol-lowering medication. Do fasting blood work before next visit in 6 months.

## 2019-05-24 RX ORDER — TADALAFIL 10 MG/1
TABLET ORAL
Qty: 8 TABLET | Refills: 2 | Status: SHIPPED | OUTPATIENT
Start: 2019-05-24 | End: 2019-06-27

## 2019-05-24 NOTE — TELEPHONE ENCOUNTER
Last OV : 12/17/2018 CPE w/ labs  Upcoming appt/reason:  No future appointments. Tadalafil (CIALIS) 10 MG Oral Tab 8 tablet 3 12/17/2018     Last labs: 12/5/18    Follow-up in 6 months for medication check    Please call to make appointment.

## 2019-06-27 ENCOUNTER — OFFICE VISIT (OUTPATIENT)
Dept: FAMILY MEDICINE CLINIC | Facility: CLINIC | Age: 54
End: 2019-06-27
Payer: COMMERCIAL

## 2019-06-27 VITALS
BODY MASS INDEX: 27.4 KG/M2 | DIASTOLIC BLOOD PRESSURE: 74 MMHG | HEART RATE: 80 BPM | TEMPERATURE: 98 F | HEIGHT: 69 IN | RESPIRATION RATE: 16 BRPM | OXYGEN SATURATION: 99 % | SYSTOLIC BLOOD PRESSURE: 116 MMHG | WEIGHT: 185 LBS

## 2019-06-27 DIAGNOSIS — E78.00 PURE HYPERCHOLESTEROLEMIA: Primary | ICD-10-CM

## 2019-06-27 DIAGNOSIS — N52.9 ERECTILE DYSFUNCTION, UNSPECIFIED ERECTILE DYSFUNCTION TYPE: ICD-10-CM

## 2019-06-27 DIAGNOSIS — M25.521 RIGHT ELBOW PAIN: ICD-10-CM

## 2019-06-27 DIAGNOSIS — J30.89 NON-SEASONAL ALLERGIC RHINITIS DUE TO OTHER ALLERGIC TRIGGER: ICD-10-CM

## 2019-06-27 DIAGNOSIS — L40.8 OTHER PSORIASIS: ICD-10-CM

## 2019-06-27 PROBLEM — Z13.89 SCREENING FOR GENITOURINARY CONDITION: Status: ACTIVE | Noted: 2019-06-27

## 2019-06-27 PROCEDURE — 99214 OFFICE O/P EST MOD 30 MIN: CPT | Performed by: FAMILY MEDICINE

## 2019-06-27 RX ORDER — TADALAFIL 10 MG/1
TABLET ORAL
Qty: 8 TABLET | Refills: 3 | Status: SHIPPED | OUTPATIENT
Start: 2019-06-27 | End: 2019-11-16

## 2019-06-27 RX ORDER — PHENOL 1.4 %
1 AEROSOL, SPRAY (ML) MUCOUS MEMBRANE DAILY
COMMUNITY

## 2019-06-27 RX ORDER — ATORVASTATIN CALCIUM 20 MG/1
TABLET, FILM COATED ORAL
Qty: 90 TABLET | Refills: 1 | Status: SHIPPED | OUTPATIENT
Start: 2019-06-27 | End: 2019-12-31

## 2019-06-27 NOTE — PROGRESS NOTES
Scott Blanco is a 62year old male. cc hypercholesterolemia, allergic rhinitis, elbow pain, erectal dysfunction,  HPI:   Went to the office for follow-up of hypercholesterolemia he is taking atorvastatin doing well with medication he is due for repeated headaches  Psych normal mood.     EXAM:   /74 (BP Location: Left arm, Patient Position: Sitting, Cuff Size: adult)   Pulse 80   Temp 98 °F (36.7 °C) (Oral)   Resp 16   Ht 69\"   Wt 185 lb   SpO2 99%   BMI 27.32 kg/m²   GENERAL: well developed, well no patient is asked to return in 6 months for complete physical.

## 2019-07-01 ENCOUNTER — TELEPHONE (OUTPATIENT)
Dept: FAMILY MEDICINE CLINIC | Facility: CLINIC | Age: 54
End: 2019-07-01

## 2019-07-01 ENCOUNTER — OFFICE VISIT (OUTPATIENT)
Dept: FAMILY MEDICINE CLINIC | Facility: CLINIC | Age: 54
End: 2019-07-01
Payer: COMMERCIAL

## 2019-07-01 ENCOUNTER — HOSPITAL ENCOUNTER (OUTPATIENT)
Dept: GENERAL RADIOLOGY | Age: 54
Discharge: HOME | End: 2019-07-01
Attending: FAMILY MEDICINE
Payer: COMMERCIAL

## 2019-07-01 VITALS
WEIGHT: 185 LBS | OXYGEN SATURATION: 98 % | HEIGHT: 69 IN | TEMPERATURE: 98 F | DIASTOLIC BLOOD PRESSURE: 80 MMHG | SYSTOLIC BLOOD PRESSURE: 128 MMHG | RESPIRATION RATE: 18 BRPM | HEART RATE: 78 BPM | BODY MASS INDEX: 27.4 KG/M2

## 2019-07-01 DIAGNOSIS — J34.89 SINUS PRESSURE: ICD-10-CM

## 2019-07-01 DIAGNOSIS — R05.9 COUGH: Primary | ICD-10-CM

## 2019-07-01 DIAGNOSIS — J02.9 SORE THROAT: Primary | ICD-10-CM

## 2019-07-01 DIAGNOSIS — R05.9 COUGH: ICD-10-CM

## 2019-07-01 LAB
CONTROL LINE PRESENT WITH A CLEAR BACKGROUND (YES/NO): YES YES/NO
STREP GRP A CUL-SCR: NEGATIVE

## 2019-07-01 PROCEDURE — 71046 X-RAY EXAM CHEST 2 VIEWS: CPT | Performed by: FAMILY MEDICINE

## 2019-07-01 PROCEDURE — 99214 OFFICE O/P EST MOD 30 MIN: CPT | Performed by: FAMILY MEDICINE

## 2019-07-01 PROCEDURE — 87880 STREP A ASSAY W/OPTIC: CPT | Performed by: FAMILY MEDICINE

## 2019-07-01 RX ORDER — FLUTICASONE PROPIONATE 50 MCG
2 SPRAY, SUSPENSION (ML) NASAL DAILY
Qty: 1 BOTTLE | Refills: 1 | Status: SHIPPED | OUTPATIENT
Start: 2019-07-01 | End: 2020-02-14

## 2019-07-01 RX ORDER — AZITHROMYCIN 250 MG/1
TABLET, FILM COATED ORAL
Qty: 6 TABLET | Refills: 0 | Status: SHIPPED | OUTPATIENT
Start: 2019-07-01 | End: 2019-12-27 | Stop reason: ALTCHOICE

## 2019-07-01 NOTE — TELEPHONE ENCOUNTER
Patient was seen on 6/27/19. Patient states he feels worse. Has chest congestion and is coughing up \"green phlegm and feels lethargic. \"  Please advise at [phone]

## 2019-07-01 NOTE — TELEPHONE ENCOUNTER
Please order chest x-ray PA and lateral for this patient would like him to do this today and I can see him at 6 PM today for reevaluation listen to his lungs will look at the x-ray at that time.  Dx cough Thank you

## 2019-07-01 NOTE — PATIENT INSTRUCTIONS
Start antibiotic today per directions. Take probiotic over-the-counter daily like organic yogurt while taking antibiotic. Drink plenty of fluids. Dichoso nasal spray 2 sprays nostril once a day. Claritin 10 mg tablet daily.   Take Tylenol or ibuprofen a

## 2019-07-02 NOTE — PROGRESS NOTES
Kelly Naik is a 62year old male. cc cough chest congestion sinus pressure sore throat  HPI:   Patient is come to the office not feeling well for the past couple of weeks.   He has a chest congestion feels like there is some tightness in the chest.  The congestion some postnasal drip no runny nose , no ear pain, sore throat  Neck no neck pain  RESPIRATORY: denies shortness of breath with exertion cough bringing some greenish secretions only in the morning no wheezing chest tightness  CARDIOVASCULAR: byron daily like organic yogurt while taking antibiotic. Drink plenty of fluids. Dichoso nasal spray 2 sprays nostril once a day. Claritin 10 mg tablet daily. Take Tylenol or ibuprofen as needed for fever or for pain.     Nasal spray saline over-the-counter a

## 2019-11-05 ENCOUNTER — WALK IN (OUTPATIENT)
Dept: URGENT CARE | Age: 54
End: 2019-11-05

## 2019-11-05 DIAGNOSIS — Z23 INFLUENZA VACCINE ADMINISTERED: Primary | ICD-10-CM

## 2019-11-05 PROCEDURE — 90686 IIV4 VACC NO PRSV 0.5 ML IM: CPT | Performed by: NURSE PRACTITIONER

## 2019-11-05 PROCEDURE — 90471 IMMUNIZATION ADMIN: CPT | Performed by: NURSE PRACTITIONER

## 2019-11-16 DIAGNOSIS — N52.9 ERECTILE DYSFUNCTION, UNSPECIFIED ERECTILE DYSFUNCTION TYPE: ICD-10-CM

## 2019-11-18 RX ORDER — TADALAFIL 10 MG/1
TABLET ORAL
Qty: 8 TABLET | Refills: 2 | Status: SHIPPED | OUTPATIENT
Start: 2019-11-18 | End: 2020-02-14

## 2019-11-18 NOTE — TELEPHONE ENCOUNTER
Tadalafil 10 Mg Tab Bure    Non protocol medication. Please see pended medications. Please sign if appropriate. Thank you    His last OV was on 06/27/2019.

## 2019-12-27 ENCOUNTER — TELEPHONE (OUTPATIENT)
Dept: FAMILY MEDICINE CLINIC | Facility: CLINIC | Age: 54
End: 2019-12-27

## 2019-12-27 ENCOUNTER — OFFICE VISIT (OUTPATIENT)
Dept: FAMILY MEDICINE CLINIC | Facility: CLINIC | Age: 54
End: 2019-12-27
Payer: COMMERCIAL

## 2019-12-27 VITALS
RESPIRATION RATE: 16 BRPM | HEART RATE: 78 BPM | BODY MASS INDEX: 27.7 KG/M2 | TEMPERATURE: 98 F | SYSTOLIC BLOOD PRESSURE: 122 MMHG | WEIGHT: 187 LBS | DIASTOLIC BLOOD PRESSURE: 84 MMHG | HEIGHT: 69 IN

## 2019-12-27 DIAGNOSIS — M25.461 PAIN AND SWELLING OF RIGHT KNEE: ICD-10-CM

## 2019-12-27 DIAGNOSIS — M25.561 ACUTE PAIN OF RIGHT KNEE: Primary | ICD-10-CM

## 2019-12-27 DIAGNOSIS — M25.561 PAIN AND SWELLING OF RIGHT KNEE: ICD-10-CM

## 2019-12-27 DIAGNOSIS — E78.00 PURE HYPERCHOLESTEROLEMIA: ICD-10-CM

## 2019-12-27 PROCEDURE — 99213 OFFICE O/P EST LOW 20 MIN: CPT | Performed by: FAMILY MEDICINE

## 2019-12-27 RX ORDER — IBUPROFEN 200 MG
400 TABLET ORAL EVERY 6 HOURS PRN
COMMUNITY

## 2019-12-27 RX ORDER — DICLOFENAC SODIUM 75 MG/1
75 TABLET, DELAYED RELEASE ORAL 2 TIMES DAILY
Qty: 30 TABLET | Refills: 0 | Status: SHIPPED | OUTPATIENT
Start: 2019-12-27 | End: 2020-01-09

## 2019-12-27 NOTE — TELEPHONE ENCOUNTER
Call to pt-confirms symptoms are all left knee-rates pain as 5-9/10 and intermittent. sts has been taking 400 mg tid-helps only slightly. sts there are times when pain is zero. Advised of dr Dionicio Cooper recommendations noted below.  Discussed if knee

## 2019-12-27 NOTE — PROGRESS NOTES
Jose Manuel Juan is a 62year old male. cc right knee pain     HPI:   Patient is coming to the office complaining of having right knee pain. He was initially 1 month ago he was fixing the light twisted his knee had a pop in the knee.   Since that time the kiya 122/84   Pulse 78   Temp 97.7 °F (36.5 °C) (Oral)   Resp 16   Ht 69\"   Wt 187 lb (84.8 kg)   BMI 27.62 kg/m²   GENERAL: well developed, well nourished,in no apparent distress  SKIN: no rashes,no suspicious lesions  HEENT: atraumatic, normocephalic,  LUNGS

## 2019-12-27 NOTE — TELEPHONE ENCOUNTER
I can see him at 9:30 AM upcoming Monday. He could try ibuprofen 200 mg 1 to 2 tablets with breakfast lunch and dinner until seen.

## 2019-12-27 NOTE — TELEPHONE ENCOUNTER
Denisse Camara sts just spoke w pt-sts pt confirms he will be here for appt at 330 pm today.  appt scheduled

## 2019-12-27 NOTE — TELEPHONE ENCOUNTER
Please call patient is due to schedule a physical with . LOV 6/27/19 asked to return in 6 months. Thanks!

## 2019-12-27 NOTE — PATIENT INSTRUCTIONS
Call [phone] to schedule x-ray of your right knee. Start  [id number] mg 1 tablet twice a day take it with food does not like to matter medication. Use ice as needed. Rest.    Call orthopedic for evaluation.

## 2019-12-27 NOTE — TELEPHONE ENCOUNTER
1. What are your symptoms? Swollen knee very painful muscle soreness   Knee gave out  2. How long have you been having these symptoms? 1 week  3. Have you done anything already to treat your symptoms?      no    ADDITIONAL INFO:

## 2019-12-27 NOTE — TELEPHONE ENCOUNTER
Said 2 weeks ago or so, changing a light bulb on chair, twisted funny and has had pain ever since. When just sitting it's dull, but waling it's rather sharp. Some localized swelling. Using Advil.   See you or ortho??.

## 2019-12-31 RX ORDER — ATORVASTATIN CALCIUM 20 MG/1
TABLET, FILM COATED ORAL
Qty: 60 TABLET | Refills: 0 | Status: SHIPPED | OUTPATIENT
Start: 2019-12-31 | End: 2020-02-24

## 2020-01-02 ENCOUNTER — HOSPITAL ENCOUNTER (OUTPATIENT)
Dept: GENERAL RADIOLOGY | Age: 55
Discharge: HOME | End: 2020-01-02
Attending: FAMILY MEDICINE
Payer: COMMERCIAL

## 2020-01-02 DIAGNOSIS — M25.561 ACUTE PAIN OF RIGHT KNEE: ICD-10-CM

## 2020-01-02 DIAGNOSIS — M25.461 PAIN AND SWELLING OF RIGHT KNEE: ICD-10-CM

## 2020-01-02 DIAGNOSIS — M25.561 PAIN AND SWELLING OF RIGHT KNEE: ICD-10-CM

## 2020-01-02 PROCEDURE — 73562 X-RAY EXAM OF KNEE 3: CPT | Performed by: FAMILY MEDICINE

## 2020-01-06 ENCOUNTER — OFFICE VISIT (OUTPATIENT)
Dept: ORTHOPEDICS CLINIC | Facility: CLINIC | Age: 55
End: 2020-01-06
Payer: COMMERCIAL

## 2020-01-06 VITALS
BODY MASS INDEX: 27.4 KG/M2 | HEIGHT: 69 IN | OXYGEN SATURATION: 99 % | RESPIRATION RATE: 16 BRPM | WEIGHT: 185 LBS | HEART RATE: 73 BPM

## 2020-01-06 DIAGNOSIS — M25.561 ACUTE PAIN OF RIGHT KNEE: Primary | ICD-10-CM

## 2020-01-06 PROCEDURE — 99203 OFFICE O/P NEW LOW 30 MIN: CPT | Performed by: ORTHOPAEDIC SURGERY

## 2020-01-06 NOTE — H&P
[facility] New Patient Note    CC: Patient presents with:  Consult: right knee pain x last 3 months. no specific injury. EDW x-ray 12/27/19. rx Diclofenac.  anterior pain with kneeling on hard surface. lateral/medial soreness. some swelling.   in of neurolytic substance epidural   • OTHER SURGICAL HISTORY  2013    right knee scope     Current Outpatient Medications   Medication Sig Dispense Refill   • ATORVASTATIN 20 MG Oral Tab TAKE ONE TABLET BY MOUTH EVERY EVENING 60 tablet 0   • ibuprofen 200 M inferolateral portal sites  • Palpation: Nontender to palpation over patellar tendon, patella, tibial tubercle. Slight bogginess noted to infrapatellar bursa. Nontender to palpation over medial femoral condyle in flexion.   Slight tenderness over proximal options, including continued observation and anti-inflammatories, possibility of therapy or exercises, and injections. Since his pain is getting significantly better, will hold off on injection for now.   We did print up the [employer] knee conditioning exercise

## 2020-01-09 RX ORDER — DICLOFENAC SODIUM 75 MG/1
TABLET, DELAYED RELEASE ORAL
Qty: 30 TABLET | Refills: 0 | Status: SHIPPED | OUTPATIENT
Start: 2020-01-09 | End: 2020-02-14 | Stop reason: ALTCHOICE

## 2020-01-09 NOTE — TELEPHONE ENCOUNTER
Diclofenac Sodium Dr 75 Mg Tab Pack    Non protocol medication. .. Please see pended medications. Please sign if appropriate.       Thank you    Last OV: 12/27/2019---right knee pain    Last refill: 12/27/2019

## 2020-02-12 ENCOUNTER — APPOINTMENT (OUTPATIENT)
Dept: LAB | Age: 55
End: 2020-02-12
Attending: FAMILY MEDICINE
Payer: COMMERCIAL

## 2020-02-12 DIAGNOSIS — E78.00 PURE HYPERCHOLESTEROLEMIA: ICD-10-CM

## 2020-02-12 LAB
ALBUMIN SERPL-MCNC: 4.6 G/DL (ref 3.4–5)
ALBUMIN/GLOB SERPL: 1.4 {RATIO} (ref 1–2)
ALP LIVER SERPL-CCNC: 63 U/L (ref 45–117)
ALT SERPL-CCNC: 36 U/L (ref 16–61)
ANION GAP SERPL CALC-SCNC: 2 MMOL/L (ref 0–18)
AST SERPL-CCNC: 28 U/L (ref 15–37)
BILIRUB SERPL-MCNC: 0.8 MG/DL (ref 0.1–2)
BUN BLD-MCNC: 15 MG/DL (ref 7–18)
BUN/CREAT SERPL: 13.8 (ref 10–20)
CALCIUM BLD-MCNC: 9.5 MG/DL (ref 8.5–10.1)
CHLORIDE SERPL-SCNC: 106 MMOL/L (ref 98–112)
CHOLEST SMN-MCNC: 172 MG/DL (ref ?–200)
CO2 SERPL-SCNC: 31 MMOL/L (ref 21–32)
CREAT BLD-MCNC: 1.09 MG/DL (ref 0.7–1.3)
GLOBULIN PLAS-MCNC: 3.4 G/DL (ref 2.8–4.4)
GLUCOSE BLD-MCNC: 89 MG/DL (ref 70–99)
HDLC SERPL-MCNC: 65 MG/DL (ref 40–59)
LDLC SERPL CALC-MCNC: 96 MG/DL (ref ?–100)
M PROTEIN MFR SERPL ELPH: 8 G/DL (ref 6.4–8.2)
NONHDLC SERPL-MCNC: 107 MG/DL (ref ?–130)
OSMOLALITY SERPL CALC.SUM OF ELEC: 288 MOSM/KG (ref 275–295)
PATIENT FASTING Y/N/NP: YES
PATIENT FASTING Y/N/NP: YES
POTASSIUM SERPL-SCNC: 4.6 MMOL/L (ref 3.5–5.1)
SODIUM SERPL-SCNC: 139 MMOL/L (ref 136–145)
TRIGL SERPL-MCNC: 53 MG/DL (ref 30–149)
VLDLC SERPL CALC-MCNC: 11 MG/DL (ref 0–30)

## 2020-02-12 PROCEDURE — 80053 COMPREHEN METABOLIC PANEL: CPT

## 2020-02-12 PROCEDURE — 36415 COLL VENOUS BLD VENIPUNCTURE: CPT

## 2020-02-12 PROCEDURE — 80061 LIPID PANEL: CPT

## 2020-02-14 ENCOUNTER — LAB ENCOUNTER (OUTPATIENT)
Dept: LAB | Age: 55
End: 2020-02-14
Attending: FAMILY MEDICINE
Payer: COMMERCIAL

## 2020-02-14 ENCOUNTER — OFFICE VISIT (OUTPATIENT)
Dept: FAMILY MEDICINE CLINIC | Facility: CLINIC | Age: 55
End: 2020-02-14
Payer: COMMERCIAL

## 2020-02-14 VITALS
HEIGHT: 69 IN | WEIGHT: 186 LBS | BODY MASS INDEX: 27.55 KG/M2 | SYSTOLIC BLOOD PRESSURE: 134 MMHG | DIASTOLIC BLOOD PRESSURE: 74 MMHG | RESPIRATION RATE: 14 BRPM | OXYGEN SATURATION: 97 % | TEMPERATURE: 97 F | HEART RATE: 90 BPM

## 2020-02-14 DIAGNOSIS — R12 HEARTBURN: ICD-10-CM

## 2020-02-14 DIAGNOSIS — Z00.00 PHYSICAL EXAM, ANNUAL: ICD-10-CM

## 2020-02-14 DIAGNOSIS — E55.9 VITAMIN D DEFICIENCY: ICD-10-CM

## 2020-02-14 DIAGNOSIS — Z00.00 PHYSICAL EXAM, ANNUAL: Primary | ICD-10-CM

## 2020-02-14 DIAGNOSIS — N52.9 ERECTILE DYSFUNCTION, UNSPECIFIED ERECTILE DYSFUNCTION TYPE: ICD-10-CM

## 2020-02-14 DIAGNOSIS — R13.10 DYSPHAGIA, UNSPECIFIED TYPE: ICD-10-CM

## 2020-02-14 LAB
BASOPHILS # BLD AUTO: 0.06 X10(3) UL (ref 0–0.2)
BASOPHILS NFR BLD AUTO: 0.9 %
BILIRUB UR QL STRIP.AUTO: NEGATIVE
CLARITY UR REFRACT.AUTO: CLEAR
COLOR UR AUTO: YELLOW
COMPLEXED PSA SERPL-MCNC: 1.94 NG/ML (ref ?–4)
DEPRECATED RDW RBC AUTO: 41.5 FL (ref 35.1–46.3)
EOSINOPHIL # BLD AUTO: 0.11 X10(3) UL (ref 0–0.7)
EOSINOPHIL NFR BLD AUTO: 1.6 %
ERYTHROCYTE [DISTWIDTH] IN BLOOD BY AUTOMATED COUNT: 12.4 % (ref 11–15)
GLUCOSE UR STRIP.AUTO-MCNC: NEGATIVE MG/DL
HCT VFR BLD AUTO: 45.9 % (ref 39–53)
HGB BLD-MCNC: 15 G/DL (ref 13–17.5)
IMM GRANULOCYTES # BLD AUTO: 0.09 X10(3) UL (ref 0–1)
IMM GRANULOCYTES NFR BLD: 1.3 %
KETONES UR STRIP.AUTO-MCNC: NEGATIVE MG/DL
LEUKOCYTE ESTERASE UR QL STRIP.AUTO: NEGATIVE
LYMPHOCYTES # BLD AUTO: 1.94 X10(3) UL (ref 1–4)
LYMPHOCYTES NFR BLD AUTO: 27.7 %
MCH RBC QN AUTO: 29.9 PG (ref 26–34)
MCHC RBC AUTO-ENTMCNC: 32.7 G/DL (ref 31–37)
MCV RBC AUTO: 91.4 FL (ref 80–100)
MONOCYTES # BLD AUTO: 0.61 X10(3) UL (ref 0.1–1)
MONOCYTES NFR BLD AUTO: 8.7 %
NEUTROPHILS # BLD AUTO: 4.2 X10 (3) UL (ref 1.5–7.7)
NEUTROPHILS # BLD AUTO: 4.2 X10(3) UL (ref 1.5–7.7)
NEUTROPHILS NFR BLD AUTO: 59.8 %
NITRITE UR QL STRIP.AUTO: NEGATIVE
PH UR STRIP.AUTO: 5 [PH] (ref 4.5–8)
PLATELET # BLD AUTO: 164 10(3)UL (ref 150–450)
PROT UR STRIP.AUTO-MCNC: NEGATIVE MG/DL
RBC # BLD AUTO: 5.02 X10(6)UL (ref 4.3–5.7)
RBC UR QL AUTO: NEGATIVE
SP GR UR STRIP.AUTO: 1.02 (ref 1–1.03)
TSI SER-ACNC: 0.81 MIU/ML (ref 0.36–3.74)
UROBILINOGEN UR STRIP.AUTO-MCNC: <2 MG/DL
VIT D+METAB SERPL-MCNC: 30.5 NG/ML (ref 30–100)
WBC # BLD AUTO: 7 X10(3) UL (ref 4–11)

## 2020-02-14 PROCEDURE — 84443 ASSAY THYROID STIM HORMONE: CPT | Performed by: FAMILY MEDICINE

## 2020-02-14 PROCEDURE — 36415 COLL VENOUS BLD VENIPUNCTURE: CPT | Performed by: FAMILY MEDICINE

## 2020-02-14 PROCEDURE — 90471 IMMUNIZATION ADMIN: CPT | Performed by: FAMILY MEDICINE

## 2020-02-14 PROCEDURE — 90632 HEPA VACCINE ADULT IM: CPT | Performed by: FAMILY MEDICINE

## 2020-02-14 PROCEDURE — 99396 PREV VISIT EST AGE 40-64: CPT | Performed by: FAMILY MEDICINE

## 2020-02-14 PROCEDURE — 81003 URINALYSIS AUTO W/O SCOPE: CPT | Performed by: FAMILY MEDICINE

## 2020-02-14 PROCEDURE — 82306 VITAMIN D 25 HYDROXY: CPT | Performed by: FAMILY MEDICINE

## 2020-02-14 PROCEDURE — 84153 ASSAY OF PSA TOTAL: CPT | Performed by: FAMILY MEDICINE

## 2020-02-14 PROCEDURE — 85025 COMPLETE CBC W/AUTO DIFF WBC: CPT | Performed by: FAMILY MEDICINE

## 2020-02-14 RX ORDER — TADALAFIL 10 MG/1
TABLET ORAL
Qty: 8 TABLET | Refills: 2 | Status: SHIPPED | OUTPATIENT
Start: 2020-02-14 | End: 2020-05-13

## 2020-02-14 RX ORDER — FLUTICASONE PROPIONATE 50 MCG
2 SPRAY, SUSPENSION (ML) NASAL DAILY
Qty: 1 BOTTLE | Refills: 1 | Status: SHIPPED | OUTPATIENT
Start: 2020-02-14 | End: 2021-04-20

## 2020-02-14 NOTE — PROGRESS NOTES
Richi Hernandez is a 60year old male who presents for a complete physical exam.   HPI:   Pt complains of  having swallowing problems and some heartburn.   He is seen Dr. Javi Interiano in the past who did his colonoscopy will refer him to Dr. Merlinda Mattock will need a sco • Tadalafil 10 MG Oral Tab TAKE ONE TABLET BY MOUTH DAILY AS NEEDED 8 tablet 2   • ATORVASTATIN 20 MG Oral Tab TAKE ONE TABLET BY MOUTH EVERY EVENING 60 tablet 0   • ibuprofen 200 MG Oral Tab Take 400 mg by mouth every 6 (six) hours as needed for Pain. on exertion  GI: denies abdominal pain,denies heartburn  : denies nocturia or changes in stream  MUSCULOSKELETAL: denies back pain  NEURO: denies headaches  PSYCHE: denies depression or anxiety  HEMATOLOGIC: denies hx of anemia  ENDOCRINE: denies thyroid Total Protein 8.0 6.4 - 8.2 g/dL    Albumin 4.6 3.4 - 5.0 g/dL    Globulin  3.4 2.8 - 4.4 g/dL    A/G Ratio 1.4 1.0 - 2.0    FASTING Yes    LIPID PANEL   Result Value Ref Range    Cholesterol, Total 172 <200 mg/dL    HDL Cholesterol 65 (H) 40 - 59 mg/dL

## 2020-02-22 DIAGNOSIS — E78.00 PURE HYPERCHOLESTEROLEMIA: ICD-10-CM

## 2020-02-24 RX ORDER — ATORVASTATIN CALCIUM 20 MG/1
TABLET, FILM COATED ORAL
Qty: 60 TABLET | Refills: 5 | Status: SHIPPED | OUTPATIENT
Start: 2020-02-24 | End: 2021-01-20

## 2020-05-13 DIAGNOSIS — N52.9 ERECTILE DYSFUNCTION, UNSPECIFIED ERECTILE DYSFUNCTION TYPE: ICD-10-CM

## 2020-05-13 RX ORDER — TADALAFIL 10 MG/1
TABLET ORAL
Qty: 8 TABLET | Refills: 0 | Status: SHIPPED | OUTPATIENT
Start: 2020-05-13 | End: 2020-06-17

## 2020-06-17 DIAGNOSIS — N52.9 ERECTILE DYSFUNCTION, UNSPECIFIED ERECTILE DYSFUNCTION TYPE: ICD-10-CM

## 2020-06-17 RX ORDER — TADALAFIL 10 MG/1
TABLET ORAL
Qty: 8 TABLET | Refills: 0 | Status: SHIPPED | OUTPATIENT
Start: 2020-06-17 | End: 2020-07-17

## 2020-07-17 DIAGNOSIS — N52.9 ERECTILE DYSFUNCTION, UNSPECIFIED ERECTILE DYSFUNCTION TYPE: ICD-10-CM

## 2020-07-17 RX ORDER — TADALAFIL 10 MG/1
TABLET ORAL
Qty: 8 TABLET | Refills: 0 | Status: SHIPPED | OUTPATIENT
Start: 2020-07-17 | End: 2020-08-17

## 2020-08-17 DIAGNOSIS — N52.9 ERECTILE DYSFUNCTION, UNSPECIFIED ERECTILE DYSFUNCTION TYPE: ICD-10-CM

## 2020-08-17 RX ORDER — TADALAFIL 10 MG/1
TABLET ORAL
Qty: 8 TABLET | Refills: 0 | Status: SHIPPED | OUTPATIENT
Start: 2020-08-17 | End: 2020-09-11

## 2020-09-10 DIAGNOSIS — N52.9 ERECTILE DYSFUNCTION, UNSPECIFIED ERECTILE DYSFUNCTION TYPE: ICD-10-CM

## 2020-09-11 RX ORDER — TADALAFIL 10 MG/1
TABLET ORAL
Qty: 8 TABLET | Refills: 1 | Status: SHIPPED | OUTPATIENT
Start: 2020-09-11 | End: 2020-11-05

## 2020-10-03 ENCOUNTER — HOSPITAL ENCOUNTER (OUTPATIENT)
Age: 55
Discharge: HOME | End: 2020-10-03
Payer: COMMERCIAL

## 2020-10-03 VITALS
RESPIRATION RATE: 18 BRPM | HEART RATE: 99 BPM | TEMPERATURE: 98 F | OXYGEN SATURATION: 96 % | DIASTOLIC BLOOD PRESSURE: 90 MMHG | SYSTOLIC BLOOD PRESSURE: 152 MMHG

## 2020-10-03 DIAGNOSIS — R50.9 FEVER, UNSPECIFIED FEVER CAUSE: Primary | ICD-10-CM

## 2020-10-03 DIAGNOSIS — B34.9 VIRAL SYNDROME: ICD-10-CM

## 2020-10-03 PROCEDURE — 99202 OFFICE O/P NEW SF 15 MIN: CPT | Performed by: PHYSICIAN ASSISTANT

## 2020-10-03 PROCEDURE — U0003 INFECTIOUS AGENT DETECTION BY NUCLEIC ACID (DNA OR RNA); SEVERE ACUTE RESPIRATORY SYNDROME CORONAVIRUS 2 (SARS-COV-2) (CORONAVIRUS DISEASE [COVID-19]), AMPLIFIED PROBE TECHNIQUE, MAKING USE OF HIGH THROUGHPUT TECHNOLOGIES AS DESCRIBED BY CMS-2020-01-R: HCPCS | Performed by: PHYSICIAN ASSISTANT

## 2020-10-03 NOTE — ED PROVIDER NOTES
Patient Seen in: [facility]      History   Patient presents with:  Testing    Stated Complaint: covid test- fever - body aches     HPI    CHIEF COMPLAINT: Fever, body aches    HISTORY OF PRESENT ILLNESS: Patient is a pleasa Smoking status: Former Smoker        Types: Cigars      Smokeless tobacco: Never Used    Alcohol use: Yes      Alcohol/week: 0.0 standard drinks      Comment: 3 times a week    Drug use:  No             Review of Systems    Positive for stated complaint: co was no longer present. There was no indication for further evaluation, treatment or admission on an emergency basis. Comprehensive verbal and written discharge and follow-up instructions were provided to help prevent relapse or worsening.    I discussed t

## 2020-10-03 NOTE — ED INITIAL ASSESSMENT (HPI)
Pt is here for covid testing. Pt states that he woke up this morning with body aches and fever. Pt has taken motrin prior to coming on IC.

## 2020-11-04 DIAGNOSIS — N52.9 ERECTILE DYSFUNCTION, UNSPECIFIED ERECTILE DYSFUNCTION TYPE: ICD-10-CM

## 2020-11-05 RX ORDER — TADALAFIL 10 MG/1
TABLET ORAL
Qty: 8 TABLET | Refills: 0 | Status: SHIPPED | OUTPATIENT
Start: 2020-11-05 | End: 2020-12-14

## 2020-11-08 ENCOUNTER — HOSPITAL ENCOUNTER (OUTPATIENT)
Age: 55
Discharge: HOME | End: 2020-11-08
Payer: COMMERCIAL

## 2020-11-08 VITALS
TEMPERATURE: 98 F | HEART RATE: 76 BPM | DIASTOLIC BLOOD PRESSURE: 78 MMHG | OXYGEN SATURATION: 100 % | SYSTOLIC BLOOD PRESSURE: 149 MMHG | RESPIRATION RATE: 20 BRPM

## 2020-11-08 DIAGNOSIS — Z20.822 SUSPECTED COVID-19 VIRUS INFECTION: Primary | ICD-10-CM

## 2020-11-08 PROCEDURE — 99213 OFFICE O/P EST LOW 20 MIN: CPT

## 2020-11-08 NOTE — ED INITIAL ASSESSMENT (HPI)
Requesting Covid test Exposed to an individual with positive Covid infection. Denies Covid symptoms.

## 2020-11-08 NOTE — ED PROVIDER NOTES
Patient Seen in: [facility]      History   Patient presents with:  Testing    Stated Complaint: Covid Test-Expose    57-year-old male presents today with complaints of headache and general fatigue with recent exposure to COVID-19.   Juana [id number]]   /78   Pulse 76   Resp 20   Temp 97.9 °F (36.6 °C)   Temp src Temporal   SpO2 100 %   O2 Device None (Room air)       Current:/78   Pulse 76   Temp 97.9 °F (36.6 °C) (Temporal)   Resp 20   SpO2 100%         Physical Exam  Vitals signs an virus infection  (primary encounter diagnosis)    Disposition:  Discharge  11/8/2020 11:25 am    Follow-up:  MD Felipe Slaughter [phone]      As needed          Medications Prescribed:  Current Discharg

## 2020-12-02 DIAGNOSIS — N52.9 ERECTILE DYSFUNCTION, UNSPECIFIED ERECTILE DYSFUNCTION TYPE: ICD-10-CM

## 2020-12-14 RX ORDER — TADALAFIL 10 MG/1
TABLET ORAL
Qty: 8 TABLET | Refills: 0 | Status: SHIPPED | OUTPATIENT
Start: 2020-12-14 | End: 2021-01-20

## 2020-12-14 NOTE — TELEPHONE ENCOUNTER
Tadalafil 10 Mg Tab Sunp    Non protocol medication. Please see pended medications. Please sign if appropriate. Thank you    The pt has an upcoming appt scheduled for 01/21/2021.

## 2021-01-20 ENCOUNTER — OFFICE VISIT (OUTPATIENT)
Dept: FAMILY MEDICINE CLINIC | Facility: CLINIC | Age: 56
End: 2021-01-20
Payer: COMMERCIAL

## 2021-01-20 VITALS
HEART RATE: 72 BPM | TEMPERATURE: 97 F | SYSTOLIC BLOOD PRESSURE: 122 MMHG | HEIGHT: 69 IN | RESPIRATION RATE: 16 BRPM | WEIGHT: 185 LBS | BODY MASS INDEX: 27.4 KG/M2 | DIASTOLIC BLOOD PRESSURE: 68 MMHG | OXYGEN SATURATION: 100 %

## 2021-01-20 DIAGNOSIS — E78.00 PURE HYPERCHOLESTEROLEMIA: Primary | ICD-10-CM

## 2021-01-20 DIAGNOSIS — N52.9 ERECTILE DYSFUNCTION, UNSPECIFIED ERECTILE DYSFUNCTION TYPE: ICD-10-CM

## 2021-01-20 DIAGNOSIS — K21.9 GASTROESOPHAGEAL REFLUX DISEASE WITHOUT ESOPHAGITIS: ICD-10-CM

## 2021-01-20 DIAGNOSIS — E55.9 VITAMIN D DEFICIENCY: ICD-10-CM

## 2021-01-20 DIAGNOSIS — Z00.00 LABORATORY TESTS ORDERED AS PART OF A COMPLETE PHYSICAL EXAM (CPE): ICD-10-CM

## 2021-01-20 PROCEDURE — 3074F SYST BP LT 130 MM HG: CPT | Performed by: FAMILY MEDICINE

## 2021-01-20 PROCEDURE — 99214 OFFICE O/P EST MOD 30 MIN: CPT | Performed by: FAMILY MEDICINE

## 2021-01-20 PROCEDURE — 3008F BODY MASS INDEX DOCD: CPT | Performed by: FAMILY MEDICINE

## 2021-01-20 PROCEDURE — 3078F DIAST BP <80 MM HG: CPT | Performed by: FAMILY MEDICINE

## 2021-01-20 PROCEDURE — 90471 IMMUNIZATION ADMIN: CPT | Performed by: FAMILY MEDICINE

## 2021-01-20 PROCEDURE — 90715 TDAP VACCINE 7 YRS/> IM: CPT | Performed by: FAMILY MEDICINE

## 2021-01-20 RX ORDER — ATORVASTATIN CALCIUM 20 MG/1
20 TABLET, FILM COATED ORAL EVERY EVENING
Qty: 60 TABLET | Refills: 5 | Status: SHIPPED | OUTPATIENT
Start: 2021-01-20 | End: 2021-11-10

## 2021-01-20 RX ORDER — CLOBETASOL PROPIONATE 0.5 MG/G
CREAM TOPICAL
COMMUNITY
Start: 2020-12-07

## 2021-01-20 RX ORDER — TADALAFIL 10 MG/1
10 TABLET ORAL
Qty: 30 TABLET | Refills: 0 | Status: SHIPPED | OUTPATIENT
Start: 2021-01-20 | End: 2021-05-20

## 2021-01-20 NOTE — PATIENT INSTRUCTIONS
Call [phone] to schedule fasting blood work 1 week prior physical.  Continue current medications. Healthy diet. Keep good hydration.

## 2021-01-21 NOTE — PROGRESS NOTES
Lianet Guzman is a 60year old male. cc hypercholesterolemia, erectile dysfunction, GERD, vitamin D deficiency  HPI:   Hypercholesterolemia patient is taking atorvastatin 20 mg 1 tablet daily he is doing well with medication. Needs refill.   No chest pain Social History:  Social History    Tobacco Use      Smoking status: Former Smoker        Types: Cigars      Smokeless tobacco: Never Used    Alcohol use:  Yes      Alcohol/week: 0.0 standard drinks      Comment: 3 times a week    Drug use: No       REVIEW Reflex [E]      TdaP (Adacel, Boostrix) (51971) (DX V06.1/Z23)      Meds & Refills for this Visit:  Requested Prescriptions     Signed Prescriptions Disp Refills   • atorvastatin 20 MG Oral Tab 60 tablet 5     Sig: Take 1 tablet (20 mg total) by mouth ever

## 2021-01-29 ENCOUNTER — TELEMEDICINE (OUTPATIENT)
Dept: FAMILY MEDICINE CLINIC | Facility: CLINIC | Age: 56
End: 2021-01-29

## 2021-01-29 ENCOUNTER — LAB ENCOUNTER (OUTPATIENT)
Dept: LAB | Facility: HOSPITAL | Age: 56
End: 2021-01-29
Attending: FAMILY MEDICINE
Payer: COMMERCIAL

## 2021-01-29 ENCOUNTER — TELEPHONE (OUTPATIENT)
Dept: FAMILY MEDICINE CLINIC | Facility: CLINIC | Age: 56
End: 2021-01-29

## 2021-01-29 DIAGNOSIS — R51.9 NEW ONSET HEADACHE: ICD-10-CM

## 2021-01-29 DIAGNOSIS — R52 BODY ACHES: ICD-10-CM

## 2021-01-29 DIAGNOSIS — R50.9 FEVER, UNSPECIFIED FEVER CAUSE: ICD-10-CM

## 2021-01-29 DIAGNOSIS — R53.83 FATIGUE, UNSPECIFIED TYPE: ICD-10-CM

## 2021-01-29 DIAGNOSIS — R51.9 NEW ONSET HEADACHE: Primary | ICD-10-CM

## 2021-01-29 PROCEDURE — 99213 OFFICE O/P EST LOW 20 MIN: CPT | Performed by: FAMILY MEDICINE

## 2021-01-29 NOTE — TELEPHONE ENCOUNTER
Before I call  If he agrees to teleV , are you able to add? Or send to UC/[facility] ?    Pls advise  Thank you

## 2021-01-29 NOTE — TELEPHONE ENCOUNTER
We need a televisit before ordering any tests. I could work him in at noon for video visit in order the test at [facility] to go to urgent care for evaluation.   Thank you No

## 2021-01-29 NOTE — PROGRESS NOTES
Virtual/Telephone Check-In    Raissa Kruse verbally consents to a Virtual/Telephone Check-In service on 01/29/21. Patient understands and accepts financial responsibility for any deductible, co-insurance and/or co-pays associated with this service.  This hemorrhoids without mention of complication     Allergic rhinitis     Displacement of lumbar intervertebral disc without myelopathy     Pain in joint, lower leg     Neoplasm of uncertain behavior of skin     Other and unspecified hyperlipidemia     Other p documentation. Patient also advised to follow CDC guidelines for self isolation/social distancing and symptomatic treatment as outlined on CDC Patient Guidelines.   Emily Arceo MD

## 2021-01-29 NOTE — TELEPHONE ENCOUNTER
Patient woke up today feeling feverish, cough and congestion. Patient wants to be tested for COVID. I offered patient virtual appointment with one of our providers but he declined since he already saw Dr. Lisa Lu  On 1/20 and doesn't see why he needs to have another appointment. Please call patient back.

## 2021-01-30 LAB — SARS-COV-2 RNA RESP QL NAA+PROBE: NOT DETECTED

## 2021-02-15 ENCOUNTER — PATIENT MESSAGE (OUTPATIENT)
Dept: FAMILY MEDICINE CLINIC | Facility: CLINIC | Age: 56
End: 2021-02-15

## 2021-02-15 NOTE — TELEPHONE ENCOUNTER
From: Keily Moon  To: Phyllis Bright MD  Sent: 2/15/2021 11:27 AM CST  Subject: Non-Urgent Medical Question    I have a physical next week and wondering how and where best to do blood work prior?

## 2021-02-16 ENCOUNTER — LAB ENCOUNTER (OUTPATIENT)
Dept: LAB | Age: 56
End: 2021-02-16
Attending: FAMILY MEDICINE
Payer: COMMERCIAL

## 2021-02-16 DIAGNOSIS — Z00.00 LABORATORY TESTS ORDERED AS PART OF A COMPLETE PHYSICAL EXAM (CPE): ICD-10-CM

## 2021-02-16 DIAGNOSIS — E78.00 PURE HYPERCHOLESTEROLEMIA: ICD-10-CM

## 2021-02-16 DIAGNOSIS — E55.9 VITAMIN D DEFICIENCY: ICD-10-CM

## 2021-02-16 LAB
ALBUMIN SERPL-MCNC: 4.3 G/DL (ref 3.4–5)
ALBUMIN/GLOB SERPL: 1.2 {RATIO} (ref 1–2)
ALP LIVER SERPL-CCNC: 62 U/L
ALT SERPL-CCNC: 41 U/L
ANION GAP SERPL CALC-SCNC: 6 MMOL/L (ref 0–18)
AST SERPL-CCNC: 23 U/L (ref 15–37)
BASOPHILS # BLD AUTO: 0.08 X10(3) UL (ref 0–0.2)
BASOPHILS NFR BLD AUTO: 1.1 %
BILIRUB SERPL-MCNC: 0.6 MG/DL (ref 0.1–2)
BILIRUB UR QL STRIP.AUTO: NEGATIVE
BUN BLD-MCNC: 18 MG/DL (ref 7–18)
BUN/CREAT SERPL: 15.3 (ref 10–20)
CALCIUM BLD-MCNC: 8.5 MG/DL (ref 8.5–10.1)
CHLORIDE SERPL-SCNC: 109 MMOL/L (ref 98–112)
CHOLEST SMN-MCNC: 178 MG/DL (ref ?–200)
CO2 SERPL-SCNC: 27 MMOL/L (ref 21–32)
COLOR UR AUTO: YELLOW
COMPLEXED PSA SERPL-MCNC: 2.18 NG/ML (ref ?–4)
CREAT BLD-MCNC: 1.18 MG/DL
DEPRECATED RDW RBC AUTO: 41.7 FL (ref 35.1–46.3)
EOSINOPHIL # BLD AUTO: 0.13 X10(3) UL (ref 0–0.7)
EOSINOPHIL NFR BLD AUTO: 1.8 %
ERYTHROCYTE [DISTWIDTH] IN BLOOD BY AUTOMATED COUNT: 12.6 % (ref 11–15)
GLOBULIN PLAS-MCNC: 3.6 G/DL (ref 2.8–4.4)
GLUCOSE BLD-MCNC: 96 MG/DL (ref 70–99)
GLUCOSE UR STRIP.AUTO-MCNC: NEGATIVE MG/DL
HCT VFR BLD AUTO: 44.3 %
HDLC SERPL-MCNC: 65 MG/DL (ref 40–59)
HGB BLD-MCNC: 14.8 G/DL
IMM GRANULOCYTES # BLD AUTO: 0.06 X10(3) UL (ref 0–1)
IMM GRANULOCYTES NFR BLD: 0.8 %
LDLC SERPL CALC-MCNC: 81 MG/DL (ref ?–100)
LEUKOCYTE ESTERASE UR QL STRIP.AUTO: NEGATIVE
LYMPHOCYTES # BLD AUTO: 1.8 X10(3) UL (ref 1–4)
LYMPHOCYTES NFR BLD AUTO: 25.5 %
M PROTEIN MFR SERPL ELPH: 7.9 G/DL (ref 6.4–8.2)
MCH RBC QN AUTO: 30.3 PG (ref 26–34)
MCHC RBC AUTO-ENTMCNC: 33.4 G/DL (ref 31–37)
MCV RBC AUTO: 90.6 FL
MONOCYTES # BLD AUTO: 0.55 X10(3) UL (ref 0.1–1)
MONOCYTES NFR BLD AUTO: 7.8 %
NEUTROPHILS # BLD AUTO: 4.45 X10 (3) UL (ref 1.5–7.7)
NEUTROPHILS # BLD AUTO: 4.45 X10(3) UL (ref 1.5–7.7)
NEUTROPHILS NFR BLD AUTO: 63 %
NITRITE UR QL STRIP.AUTO: NEGATIVE
NONHDLC SERPL-MCNC: 113 MG/DL (ref ?–130)
OSMOLALITY SERPL CALC.SUM OF ELEC: 296 MOSM/KG (ref 275–295)
PATIENT FASTING Y/N/NP: YES
PATIENT FASTING Y/N/NP: YES
PH UR STRIP.AUTO: 5 [PH] (ref 4.5–8)
PLATELET # BLD AUTO: 188 10(3)UL (ref 150–450)
POTASSIUM SERPL-SCNC: 3.9 MMOL/L (ref 3.5–5.1)
PROT UR STRIP.AUTO-MCNC: NEGATIVE MG/DL
RBC # BLD AUTO: 4.89 X10(6)UL
RBC UR QL AUTO: NEGATIVE
SODIUM SERPL-SCNC: 142 MMOL/L (ref 136–145)
SP GR UR STRIP.AUTO: 1.02 (ref 1–1.03)
TRIGL SERPL-MCNC: 160 MG/DL (ref 30–149)
TSI SER-ACNC: 1.1 MIU/ML (ref 0.36–3.74)
UROBILINOGEN UR STRIP.AUTO-MCNC: <2 MG/DL
VIT D+METAB SERPL-MCNC: 28.4 NG/ML (ref 30–100)
VLDLC SERPL CALC-MCNC: 32 MG/DL (ref 0–30)
WBC # BLD AUTO: 7.1 X10(3) UL (ref 4–11)

## 2021-02-16 PROCEDURE — 82306 VITAMIN D 25 HYDROXY: CPT | Performed by: FAMILY MEDICINE

## 2021-02-16 PROCEDURE — 84153 ASSAY OF PSA TOTAL: CPT | Performed by: FAMILY MEDICINE

## 2021-02-16 PROCEDURE — 80061 LIPID PANEL: CPT | Performed by: FAMILY MEDICINE

## 2021-02-16 PROCEDURE — 36415 COLL VENOUS BLD VENIPUNCTURE: CPT | Performed by: FAMILY MEDICINE

## 2021-02-16 PROCEDURE — 81003 URINALYSIS AUTO W/O SCOPE: CPT | Performed by: FAMILY MEDICINE

## 2021-02-16 PROCEDURE — 80050 GENERAL HEALTH PANEL: CPT | Performed by: FAMILY MEDICINE

## 2021-02-25 ENCOUNTER — OFFICE VISIT (OUTPATIENT)
Dept: FAMILY MEDICINE CLINIC | Facility: CLINIC | Age: 56
End: 2021-02-25
Payer: COMMERCIAL

## 2021-02-25 VITALS
WEIGHT: 187 LBS | HEIGHT: 69 IN | RESPIRATION RATE: 16 BRPM | SYSTOLIC BLOOD PRESSURE: 130 MMHG | HEART RATE: 72 BPM | TEMPERATURE: 97 F | BODY MASS INDEX: 27.7 KG/M2 | DIASTOLIC BLOOD PRESSURE: 70 MMHG

## 2021-02-25 DIAGNOSIS — Z00.00 PHYSICAL EXAM, ANNUAL: Primary | ICD-10-CM

## 2021-02-25 PROCEDURE — 3078F DIAST BP <80 MM HG: CPT | Performed by: FAMILY MEDICINE

## 2021-02-25 PROCEDURE — 3008F BODY MASS INDEX DOCD: CPT | Performed by: FAMILY MEDICINE

## 2021-02-25 PROCEDURE — 99396 PREV VISIT EST AGE 40-64: CPT | Performed by: FAMILY MEDICINE

## 2021-02-25 PROCEDURE — 3075F SYST BP GE 130 - 139MM HG: CPT | Performed by: FAMILY MEDICINE

## 2021-02-25 RX ORDER — ERGOCALCIFEROL 1.25 MG/1
50000 CAPSULE ORAL WEEKLY
Qty: 12 CAPSULE | Refills: 0 | Status: SHIPPED | OUTPATIENT
Start: 2021-02-25 | End: 2021-05-26

## 2021-02-25 NOTE — PATIENT INSTRUCTIONS
Healthy diet. Stay active. Start vitamin D2 50,000 units prescription once a week. Take also vitamin D3 1000 units daily over-the-counter on other days of the week. You can try glucosamine over-the-counter for joints.     Follow-up in 6 months for medic

## 2021-02-25 NOTE — PROGRESS NOTES
Rob Gracia is a 60year old male who presents for a complete physical exam.   HPI:   Pt complains of having some aches in his back also on the right side of the chest wall sometimes in the joints left index finger of the right hand.   We will try gluco Medication Sig Dispense Refill   • ergocalciferol 1.25 MG (39680 UT) Oral Cap Take 1 capsule (50,000 Units total) by mouth once a week.  12 capsule 0   • Clobetasol Propionate 0.05 % External Cream      • atorvastatin 20 MG Oral Tab Take 1 tablet (20 mg t Exercise: three times per week.   Diet: watches calories closely     REVIEW OF SYSTEMS:   GENERAL: feels well otherwise  SKIN: denies any unusual skin lesions  EYES:denies blurred vision or double vision  HEENT: denies nasal congestion, sinus pain or ST 0.70 - 1.30 mg/dL    BUN/CREA Ratio 15.3 10.0 - 20.0    Calcium, Total 8.5 8.5 - 10.1 mg/dL    Calculated Osmolality 296 (H) 275 - 295 mOsm/kg    GFR, Non- 68 >=60    GFR, -American 78 >=60    AST 23 15 - 37 U/L    ALT 41 16 - 61 U/L Prelim 4.45 1.50 - 7.70 x10 (3) uL    Neutrophil Absolute 4.45 1.50 - 7.70 x10(3) uL    Lymphocyte Absolute 1.80 1.00 - 4.00 x10(3) uL    Monocyte Absolute 0.55 0.10 - 1.00 x10(3) uL    Eosinophil Absolute 0.13 0.00 - 0.70 x10(3) uL    Basophil Absolute 0.

## 2021-04-20 RX ORDER — FLUTICASONE PROPIONATE 50 MCG
SPRAY, SUSPENSION (ML) NASAL
Qty: 16 G | Refills: 0 | Status: SHIPPED | OUTPATIENT
Start: 2021-04-20 | End: 2021-11-02

## 2021-05-18 DIAGNOSIS — N52.9 ERECTILE DYSFUNCTION, UNSPECIFIED ERECTILE DYSFUNCTION TYPE: ICD-10-CM

## 2021-05-20 ENCOUNTER — PATIENT MESSAGE (OUTPATIENT)
Dept: FAMILY MEDICINE CLINIC | Facility: CLINIC | Age: 56
End: 2021-05-20

## 2021-05-20 RX ORDER — TADALAFIL 10 MG/1
TABLET ORAL
Qty: 30 TABLET | Refills: 0 | Status: SHIPPED | OUTPATIENT
Start: 2021-05-20 | End: 2021-08-25

## 2021-05-20 RX ORDER — ERGOCALCIFEROL 1.25 MG/1
CAPSULE ORAL
Qty: 12 CAPSULE | Refills: 0 | OUTPATIENT
Start: 2021-05-20

## 2021-05-21 NOTE — TELEPHONE ENCOUNTER
From: Carla Haynes  To: Ginette Aparicio MD  Sent: 5/20/2021 1:10 PM CDT  Subject: Prescription Question    Amna: How are you? Hope well. I asked my pharmacy to renew some scripts, but no reply yet-can you check and see if any issues please?   Thank

## 2021-08-11 DIAGNOSIS — N52.9 ERECTILE DYSFUNCTION, UNSPECIFIED ERECTILE DYSFUNCTION TYPE: ICD-10-CM

## 2021-08-21 NOTE — TELEPHONE ENCOUNTER
Pt scheduled medication check for 11/10. Pt requesting refill of TADALAFIL 10 MG Oral Tab [Pharmacy Med Name: Tadalafil 10 Mg Tab Nort]. Please Advise.

## 2021-08-25 RX ORDER — TADALAFIL 10 MG/1
TABLET ORAL
Qty: 30 TABLET | Refills: 0 | Status: SHIPPED | OUTPATIENT
Start: 2021-08-25 | End: 2021-09-09

## 2021-09-02 DIAGNOSIS — N52.9 ERECTILE DYSFUNCTION, UNSPECIFIED ERECTILE DYSFUNCTION TYPE: ICD-10-CM

## 2021-09-02 RX ORDER — TADALAFIL 10 MG/1
TABLET ORAL
Qty: 30 TABLET | Refills: 0 | OUTPATIENT
Start: 2021-09-02

## 2021-09-09 RX ORDER — TADALAFIL 10 MG/1
10 TABLET ORAL
Qty: 30 TABLET | Refills: 0 | Status: SHIPPED | OUTPATIENT
Start: 2021-09-09 | End: 2021-12-23

## 2021-11-02 ENCOUNTER — LAB ENCOUNTER (OUTPATIENT)
Dept: LAB | Age: 56
End: 2021-11-02
Attending: FAMILY MEDICINE
Payer: COMMERCIAL

## 2021-11-02 DIAGNOSIS — E78.00 PURE HYPERCHOLESTEROLEMIA: ICD-10-CM

## 2021-11-02 DIAGNOSIS — E55.9 VITAMIN D DEFICIENCY: ICD-10-CM

## 2021-11-02 PROCEDURE — 80053 COMPREHEN METABOLIC PANEL: CPT

## 2021-11-02 PROCEDURE — 82306 VITAMIN D 25 HYDROXY: CPT

## 2021-11-02 PROCEDURE — 80061 LIPID PANEL: CPT

## 2021-11-02 RX ORDER — FLUTICASONE PROPIONATE 50 MCG
2 SPRAY, SUSPENSION (ML) NASAL DAILY
Qty: 16 G | Refills: 0 | Status: SHIPPED | OUTPATIENT
Start: 2021-11-02 | End: 2021-11-10

## 2021-11-10 ENCOUNTER — OFFICE VISIT (OUTPATIENT)
Dept: FAMILY MEDICINE CLINIC | Facility: CLINIC | Age: 56
End: 2021-11-10
Payer: COMMERCIAL

## 2021-11-10 VITALS
TEMPERATURE: 98 F | DIASTOLIC BLOOD PRESSURE: 78 MMHG | BODY MASS INDEX: 27.4 KG/M2 | RESPIRATION RATE: 16 BRPM | HEART RATE: 82 BPM | SYSTOLIC BLOOD PRESSURE: 132 MMHG | HEIGHT: 69 IN | WEIGHT: 185 LBS | OXYGEN SATURATION: 100 %

## 2021-11-10 DIAGNOSIS — Z00.00 LABORATORY TESTS ORDERED AS PART OF A COMPLETE PHYSICAL EXAM (CPE): ICD-10-CM

## 2021-11-10 DIAGNOSIS — E78.00 PURE HYPERCHOLESTEROLEMIA: Primary | ICD-10-CM

## 2021-11-10 DIAGNOSIS — N52.9 ERECTILE DYSFUNCTION, UNSPECIFIED ERECTILE DYSFUNCTION TYPE: ICD-10-CM

## 2021-11-10 DIAGNOSIS — N50.9 SCROTAL SKIN LESION: ICD-10-CM

## 2021-11-10 DIAGNOSIS — E55.9 VITAMIN D DEFICIENCY: ICD-10-CM

## 2021-11-10 DIAGNOSIS — J30.89 NON-SEASONAL ALLERGIC RHINITIS DUE TO OTHER ALLERGIC TRIGGER: ICD-10-CM

## 2021-11-10 PROCEDURE — 3075F SYST BP GE 130 - 139MM HG: CPT | Performed by: FAMILY MEDICINE

## 2021-11-10 PROCEDURE — 3078F DIAST BP <80 MM HG: CPT | Performed by: FAMILY MEDICINE

## 2021-11-10 PROCEDURE — 3008F BODY MASS INDEX DOCD: CPT | Performed by: FAMILY MEDICINE

## 2021-11-10 PROCEDURE — 99214 OFFICE O/P EST MOD 30 MIN: CPT | Performed by: FAMILY MEDICINE

## 2021-11-10 RX ORDER — FLUTICASONE PROPIONATE 50 MCG
2 SPRAY, SUSPENSION (ML) NASAL DAILY
Qty: 16 G | Refills: 1 | Status: SHIPPED | OUTPATIENT
Start: 2021-11-10

## 2021-11-10 RX ORDER — ATORVASTATIN CALCIUM 20 MG/1
20 TABLET, FILM COATED ORAL EVERY EVENING
Qty: 30 TABLET | Refills: 5 | Status: SHIPPED | OUTPATIENT
Start: 2021-11-10

## 2021-11-10 NOTE — PROGRESS NOTES
Sujey Carrasquillo is a 60year old male. cc hypercholesterolemia, erectile dysfunction, GERD, vitamin D deficiency  HPI:   Hypercholesterolemia patient is taking atorvastatin 20 mg 1 tablet daily he is doing well with medication. Needs refill.   No chest pain Types: Cigars      Smokeless tobacco: Never Used    Vaping Use      Vaping Use: Never used    Alcohol use:  Yes      Alcohol/week: 0.0 standard drinks      Comment: 3 times a week    Drug use: No       REVIEW OF SYSTEMS:   GENERAL HEALTH: feels well otherw LIPID PANEL   Result Value Ref Range    Cholesterol, Total 172 <200 mg/dL    HDL Cholesterol 63 (H) 40 - 59 mg/dL    Triglycerides 116 30 - 149 mg/dL    LDL Cholesterol 88 <100 mg/dL    VLDL 19 0 - 30 mg/dL    Non HDL Chol 109 <130 mg/dL    FASTING Yes

## 2021-11-18 ENCOUNTER — OFFICE VISIT (OUTPATIENT)
Dept: URGENT CARE | Age: 56
End: 2021-11-18

## 2021-11-18 DIAGNOSIS — Z23 NEEDS FLU SHOT: Primary | ICD-10-CM

## 2021-11-18 PROCEDURE — 90471 IMMUNIZATION ADMIN: CPT | Performed by: REGISTERED NURSE

## 2021-11-18 PROCEDURE — 90686 IIV4 VACC NO PRSV 0.5 ML IM: CPT | Performed by: REGISTERED NURSE

## 2021-12-23 DIAGNOSIS — N52.9 ERECTILE DYSFUNCTION, UNSPECIFIED ERECTILE DYSFUNCTION TYPE: ICD-10-CM

## 2021-12-23 RX ORDER — TADALAFIL 10 MG/1
TABLET ORAL
Qty: 30 TABLET | Refills: 0 | Status: SHIPPED | OUTPATIENT
Start: 2021-12-23

## 2021-12-26 ENCOUNTER — HOSPITAL ENCOUNTER (OUTPATIENT)
Age: 56
Discharge: HOME | End: 2021-12-26
Payer: COMMERCIAL

## 2021-12-26 VITALS
TEMPERATURE: 98 F | HEIGHT: 69 IN | HEART RATE: 97 BPM | DIASTOLIC BLOOD PRESSURE: 92 MMHG | WEIGHT: 182 LBS | BODY MASS INDEX: 26.96 KG/M2 | RESPIRATION RATE: 16 BRPM | SYSTOLIC BLOOD PRESSURE: 147 MMHG | OXYGEN SATURATION: 96 %

## 2021-12-26 DIAGNOSIS — Z20.822 ENCOUNTER FOR LABORATORY TESTING FOR COVID-19 VIRUS: Primary | ICD-10-CM

## 2021-12-26 PROCEDURE — 99212 OFFICE O/P EST SF 10 MIN: CPT | Performed by: NURSE PRACTITIONER

## 2021-12-26 NOTE — ED PROVIDER NOTES
Patient Seen in: [facility] [address] [place] [address]      History   Patient presents with:  Covid-19 Test    Stated Complaint: exposure     Subjective: This is a 23-year-old male with below stated medical history.   Presents to immediate care requesting Cov Negative for headaches. Hematological: Does not bruise/bleed easily. Positive for stated complaint: exposure   Other systems are as noted in HPI. Constitutional and vital signs reviewed.       All other systems reviewed and negative except as noted Presents to immediate care for Covid–19 testing. Patient is currently asymptomatic. PCR Covid sent out. DC home. Recommended is too early to test with exposure. Retesting should occur 5 to 7 days after the day of exposure.   Patient was advised to co

## 2022-01-04 ENCOUNTER — TELEPHONE (OUTPATIENT)
Dept: FAMILY MEDICINE CLINIC | Facility: CLINIC | Age: 57
End: 2022-01-04

## 2022-01-04 NOTE — TELEPHONE ENCOUNTER
Pt asking for recommendation for orthopedic. Pt pulled his lower back this weekend, complaining of lower back pain and sciatica. Had this problem 25 years ago per pt. Some pain has subsided but would like recommendation. Pls advise.

## 2022-01-04 NOTE — TELEPHONE ENCOUNTER
See below. Pt is PPO, not sure if he should see ortho vs neurosx?  Or start with a vs here? (where)Please advise thanks

## 2022-01-04 NOTE — TELEPHONE ENCOUNTER
That could be addressed in the office and may not require specialist referral.  Please schedule appt with Dr. Farida Zhou in the next few days or myself if she is full and cannot overbook.

## 2022-01-11 ENCOUNTER — OFFICE VISIT (OUTPATIENT)
Dept: FAMILY MEDICINE CLINIC | Facility: CLINIC | Age: 57
End: 2022-01-11
Payer: COMMERCIAL

## 2022-01-11 VITALS
OXYGEN SATURATION: 98 % | RESPIRATION RATE: 16 BRPM | SYSTOLIC BLOOD PRESSURE: 120 MMHG | HEIGHT: 69 IN | TEMPERATURE: 98 F | DIASTOLIC BLOOD PRESSURE: 76 MMHG | HEART RATE: 62 BPM | BODY MASS INDEX: 27.7 KG/M2 | WEIGHT: 187 LBS

## 2022-01-11 DIAGNOSIS — M54.50 RECURRENT LOW BACK PAIN: Primary | ICD-10-CM

## 2022-01-11 PROCEDURE — 3074F SYST BP LT 130 MM HG: CPT | Performed by: FAMILY MEDICINE

## 2022-01-11 PROCEDURE — 3078F DIAST BP <80 MM HG: CPT | Performed by: FAMILY MEDICINE

## 2022-01-11 PROCEDURE — 3008F BODY MASS INDEX DOCD: CPT | Performed by: FAMILY MEDICINE

## 2022-01-11 PROCEDURE — 99213 OFFICE O/P EST LOW 20 MIN: CPT | Performed by: FAMILY MEDICINE

## 2022-01-11 NOTE — PROGRESS NOTES
Hay Levin is a 60year old male. CC  recurrent low back pain  HPI:   Patient is coming for evaluation of the low back pain. He had low back pain in October lasted couple of days went away on its own.   Started to have some pain in the right groin ar a week    Drug use: No       REVIEW OF SYSTEMS:   GENERAL HEALTH: feels well otherwise  SKIN: denies any unusual skin lesions or rashes  RESPIRATORY: denies shortness of breath with exertion  CARDIOVASCULAR: denies chest pain on exertion  GI: denies abdomi

## 2022-01-18 NOTE — H&P
HPI:     Gabriel Zhang is a 59year old male with a PMH of HL, psoriasis, LBP. He presents as a consult from Dr Billy Bell office with:  1. small cysts on scrotal skin  2. Likely left epididymal cyst  3. ED  - 1/2 tab 10 mg cialis prn  4.  Fam h/o C ARTHROSCOPY LEFT KNEE AND DEBRIDEMENT W/ MEDIAL MENISCECTOMY;  Surgeon: Franck Ambrosio MD;  Location: [facility]   • OTHER SURGICAL HISTORY      injection of neurolytic substance epidural   • OTHER SURGICAL HISTORY  2013    right knee scope      Famil APPEARANCE: well, developed, well nourished, in no acute distress  NEUROLOGIC: nonfocal, alert and oriented  HEAD: normocephalic, atraumatic  EYES: sclera non-icteric  EARS: hearing intact  ORAL CAVITY: mucosa moist  NECK/THYROID: no obvious goiter or mass

## 2022-01-24 ENCOUNTER — OFFICE VISIT (OUTPATIENT)
Dept: SURGERY | Facility: CLINIC | Age: 57
End: 2022-01-24
Payer: COMMERCIAL

## 2022-01-24 ENCOUNTER — TELEPHONE (OUTPATIENT)
Dept: SURGERY | Facility: CLINIC | Age: 57
End: 2022-01-24

## 2022-01-24 DIAGNOSIS — L72.3 SCROTAL SEBACEOUS CYST: Primary | ICD-10-CM

## 2022-01-24 DIAGNOSIS — N40.1 BPH WITH OBSTRUCTION/LOWER URINARY TRACT SYMPTOMS: ICD-10-CM

## 2022-01-24 DIAGNOSIS — N50.89 TESTICULAR MASS: ICD-10-CM

## 2022-01-24 DIAGNOSIS — N13.8 BPH WITH OBSTRUCTION/LOWER URINARY TRACT SYMPTOMS: ICD-10-CM

## 2022-01-24 DIAGNOSIS — N52.9 ERECTILE DYSFUNCTION, UNSPECIFIED ERECTILE DYSFUNCTION TYPE: ICD-10-CM

## 2022-01-24 LAB
APPEARANCE: CLEAR
BILIRUBIN: NEGATIVE
GLUCOSE (URINE DIPSTICK): NEGATIVE MG/DL
KETONES (URINE DIPSTICK): NEGATIVE MG/DL
LEUKOCYTES: NEGATIVE
MULTISTIX LOT#: NORMAL NUMERIC
NITRITE, URINE: NEGATIVE
OCCULT BLOOD: NEGATIVE
PH, URINE: 5.5 (ref 4.5–8)
PROTEIN (URINE DIPSTICK): NEGATIVE MG/DL
SPECIFIC GRAVITY: >=1.03 (ref 1–1.03)
URINE-COLOR: YELLOW
UROBILINOGEN,SEMI-QN: 0.2 MG/DL (ref 0–1.9)

## 2022-01-24 PROCEDURE — 99244 OFF/OP CNSLTJ NEW/EST MOD 40: CPT | Performed by: UROLOGY

## 2022-01-24 PROCEDURE — 81003 URINALYSIS AUTO W/O SCOPE: CPT | Performed by: UROLOGY

## 2022-01-24 RX ORDER — TADALAFIL 5 MG/1
5 TABLET ORAL
Qty: 30 TABLET | Refills: 11 | Status: SHIPPED | OUTPATIENT
Start: 2022-01-24

## 2022-01-24 NOTE — TELEPHONE ENCOUNTER
FYI,  Patient is going to call back to let us know if he'd like to proceed with removal of epididymal cyst and/or scrotal wall cysts.     If he just wants to remove the scrotal wall cysts you can book him for 30 min procedure in office and I can prescribe v

## 2022-02-22 ENCOUNTER — PATIENT MESSAGE (OUTPATIENT)
Dept: FAMILY MEDICINE CLINIC | Facility: CLINIC | Age: 57
End: 2022-02-22

## 2022-02-23 ENCOUNTER — LAB ENCOUNTER (OUTPATIENT)
Dept: LAB | Age: 57
End: 2022-02-23
Attending: FAMILY MEDICINE
Payer: COMMERCIAL

## 2022-02-23 ENCOUNTER — OFFICE VISIT (OUTPATIENT)
Dept: FAMILY MEDICINE CLINIC | Facility: CLINIC | Age: 57
End: 2022-02-23
Payer: COMMERCIAL

## 2022-02-23 VITALS
HEIGHT: 69 IN | DIASTOLIC BLOOD PRESSURE: 68 MMHG | HEART RATE: 68 BPM | SYSTOLIC BLOOD PRESSURE: 118 MMHG | RESPIRATION RATE: 16 BRPM | OXYGEN SATURATION: 97 % | WEIGHT: 180 LBS | TEMPERATURE: 97 F | BODY MASS INDEX: 26.66 KG/M2

## 2022-02-23 DIAGNOSIS — E55.9 VITAMIN D DEFICIENCY: ICD-10-CM

## 2022-02-23 DIAGNOSIS — R19.4 ALTERED BOWEL HABITS: ICD-10-CM

## 2022-02-23 DIAGNOSIS — Z00.00 PHYSICAL EXAM, ANNUAL: Primary | ICD-10-CM

## 2022-02-23 DIAGNOSIS — Z00.00 LABORATORY TESTS ORDERED AS PART OF A COMPLETE PHYSICAL EXAM (CPE): ICD-10-CM

## 2022-02-23 LAB
ALBUMIN SERPL-MCNC: 4.5 G/DL (ref 3.4–5)
ALBUMIN/GLOB SERPL: 1.4 {RATIO} (ref 1–2)
ALP LIVER SERPL-CCNC: 63 U/L
ALT SERPL-CCNC: 38 U/L
ANION GAP SERPL CALC-SCNC: 6 MMOL/L (ref 0–18)
AST SERPL-CCNC: 26 U/L (ref 15–37)
BASOPHILS # BLD AUTO: 0.08 X10(3) UL (ref 0–0.2)
BASOPHILS NFR BLD AUTO: 1.1 %
BILIRUB SERPL-MCNC: 1 MG/DL (ref 0.1–2)
BILIRUB UR QL STRIP.AUTO: NEGATIVE
BUN BLD-MCNC: 18 MG/DL (ref 7–18)
CALCIUM BLD-MCNC: 9.9 MG/DL (ref 8.5–10.1)
CHLORIDE SERPL-SCNC: 105 MMOL/L (ref 98–112)
CHOLEST SERPL-MCNC: 192 MG/DL (ref ?–200)
CO2 SERPL-SCNC: 28 MMOL/L (ref 21–32)
COLOR UR AUTO: YELLOW
COMPLEXED PSA SERPL-MCNC: 1.96 NG/ML (ref ?–4)
CREAT BLD-MCNC: 1.12 MG/DL
EOSINOPHIL # BLD AUTO: 0.25 X10(3) UL (ref 0–0.7)
EOSINOPHIL NFR BLD AUTO: 3.6 %
ERYTHROCYTE [DISTWIDTH] IN BLOOD BY AUTOMATED COUNT: 12.5 %
FASTING PATIENT LIPID ANSWER: YES
FASTING STATUS PATIENT QL REPORTED: YES
GLOBULIN PLAS-MCNC: 3.3 G/DL (ref 2.8–4.4)
GLUCOSE BLD-MCNC: 90 MG/DL (ref 70–99)
GLUCOSE UR STRIP.AUTO-MCNC: NEGATIVE MG/DL
HCT VFR BLD AUTO: 45.1 %
HDLC SERPL-MCNC: 65 MG/DL (ref 40–59)
HGB BLD-MCNC: 15.2 G/DL
IMM GRANULOCYTES # BLD AUTO: 0.06 X10(3) UL (ref 0–1)
IMM GRANULOCYTES NFR BLD: 0.9 %
LDLC SERPL CALC-MCNC: 109 MG/DL (ref ?–100)
LEUKOCYTE ESTERASE UR QL STRIP.AUTO: NEGATIVE
LYMPHOCYTES # BLD AUTO: 2.11 X10(3) UL (ref 1–4)
LYMPHOCYTES NFR BLD AUTO: 30.1 %
MCH RBC QN AUTO: 30.8 PG (ref 26–34)
MCHC RBC AUTO-ENTMCNC: 33.7 G/DL (ref 31–37)
MCV RBC AUTO: 91.3 FL
MONOCYTES # BLD AUTO: 0.61 X10(3) UL (ref 0.1–1)
MONOCYTES NFR BLD AUTO: 8.7 %
NEUTROPHILS # BLD AUTO: 3.91 X10 (3) UL (ref 1.5–7.7)
NEUTROPHILS # BLD AUTO: 3.91 X10(3) UL (ref 1.5–7.7)
NEUTROPHILS NFR BLD AUTO: 55.6 %
NITRITE UR QL STRIP.AUTO: NEGATIVE
NONHDLC SERPL-MCNC: 127 MG/DL (ref ?–130)
OSMOLALITY SERPL CALC.SUM OF ELEC: 289 MOSM/KG (ref 275–295)
PH UR STRIP.AUTO: 5 [PH] (ref 5–8)
PLATELET # BLD AUTO: 164 10(3)UL (ref 150–450)
POTASSIUM SERPL-SCNC: 4.4 MMOL/L (ref 3.5–5.1)
PROT SERPL-MCNC: 7.8 G/DL (ref 6.4–8.2)
PROT UR STRIP.AUTO-MCNC: NEGATIVE MG/DL
RBC # BLD AUTO: 4.94 X10(6)UL
RBC UR QL AUTO: NEGATIVE
SODIUM SERPL-SCNC: 139 MMOL/L (ref 136–145)
SP GR UR STRIP.AUTO: 1.03 (ref 1–1.03)
TRIGL SERPL-MCNC: 104 MG/DL (ref 30–149)
TSI SER-ACNC: 0.85 MIU/ML (ref 0.36–3.74)
UROBILINOGEN UR STRIP.AUTO-MCNC: <2 MG/DL
VIT D+METAB SERPL-MCNC: 46.4 NG/ML (ref 30–100)
VLDLC SERPL CALC-MCNC: 18 MG/DL (ref 0–30)
WBC # BLD AUTO: 7 X10(3) UL (ref 4–11)

## 2022-02-23 PROCEDURE — 80061 LIPID PANEL: CPT | Performed by: FAMILY MEDICINE

## 2022-02-23 PROCEDURE — 3074F SYST BP LT 130 MM HG: CPT | Performed by: FAMILY MEDICINE

## 2022-02-23 PROCEDURE — 81001 URINALYSIS AUTO W/SCOPE: CPT | Performed by: FAMILY MEDICINE

## 2022-02-23 PROCEDURE — 82306 VITAMIN D 25 HYDROXY: CPT | Performed by: FAMILY MEDICINE

## 2022-02-23 PROCEDURE — 80050 GENERAL HEALTH PANEL: CPT | Performed by: FAMILY MEDICINE

## 2022-02-23 PROCEDURE — 3008F BODY MASS INDEX DOCD: CPT | Performed by: FAMILY MEDICINE

## 2022-02-23 PROCEDURE — 3078F DIAST BP <80 MM HG: CPT | Performed by: FAMILY MEDICINE

## 2022-02-23 PROCEDURE — 84153 ASSAY OF PSA TOTAL: CPT | Performed by: FAMILY MEDICINE

## 2022-02-23 PROCEDURE — 99396 PREV VISIT EST AGE 40-64: CPT | Performed by: FAMILY MEDICINE

## 2022-02-23 NOTE — PATIENT INSTRUCTIONS
Healthy diet. Stay active. Further recommendations pending test results. Follow-up in 6 months for medication check.

## 2022-03-01 LAB — TTG IGA SER-ACNC: 0.2 U/ML (ref ?–7)

## 2022-03-03 NOTE — PROGRESS NOTES
TTA is negative. I would like to see his response to diet changes and fiber. Results sent to the patient.

## 2022-05-04 ENCOUNTER — HOSPITAL ENCOUNTER (OUTPATIENT)
Age: 57
Discharge: HOME | End: 2022-05-04
Payer: COMMERCIAL

## 2022-05-04 ENCOUNTER — HOSPITAL ENCOUNTER (OUTPATIENT)
Dept: ULTRASOUND IMAGING | Age: 57
Discharge: HOME | End: 2022-05-04
Attending: UROLOGY
Payer: COMMERCIAL

## 2022-05-04 VITALS
HEIGHT: 69 IN | BODY MASS INDEX: 26.96 KG/M2 | WEIGHT: 182 LBS | TEMPERATURE: 98 F | SYSTOLIC BLOOD PRESSURE: 140 MMHG | DIASTOLIC BLOOD PRESSURE: 84 MMHG | OXYGEN SATURATION: 97 % | HEART RATE: 79 BPM | RESPIRATION RATE: 20 BRPM

## 2022-05-04 DIAGNOSIS — N50.89 TESTICULAR MASS: ICD-10-CM

## 2022-05-04 DIAGNOSIS — B34.9 VIRAL SYNDROME: Primary | ICD-10-CM

## 2022-05-04 DIAGNOSIS — Z20.822 LAB TEST NEGATIVE FOR COVID-19 VIRUS: ICD-10-CM

## 2022-05-04 LAB
POCT INFLUENZA A: NEGATIVE
POCT INFLUENZA B: NEGATIVE
SARS-COV-2 RNA RESP QL NAA+PROBE: NOT DETECTED

## 2022-05-04 PROCEDURE — 76870 US EXAM SCROTUM: CPT | Performed by: UROLOGY

## 2022-05-04 PROCEDURE — 99212 OFFICE O/P EST SF 10 MIN: CPT

## 2022-05-04 PROCEDURE — 99213 OFFICE O/P EST LOW 20 MIN: CPT

## 2022-05-04 PROCEDURE — 93975 VASCULAR STUDY: CPT | Performed by: UROLOGY

## 2022-05-04 PROCEDURE — 87502 INFLUENZA DNA AMP PROBE: CPT | Performed by: NURSE PRACTITIONER

## 2022-05-04 NOTE — ED INITIAL ASSESSMENT (HPI)
Headache and chills started Sunday. Denies fever. Pt is vaccinated with booster. Pt takes cold and sinus meds . pt just came back from [place] Sunday

## 2022-05-05 ENCOUNTER — TELEPHONE (OUTPATIENT)
Dept: SURGERY | Facility: CLINIC | Age: 57
End: 2022-05-05

## 2022-05-05 NOTE — TELEPHONE ENCOUNTER
FYI only,  I left  for patient. Reviewed US - shows ~ 2 cm left epididymal cyst as I suspected he had on exam a few months ago. Can proceed to OR for left spermatocelectomy with removal of scrotal inclusion cysts if he'd like. If he prefers observation that's OK too. If he just wants the inclusion cysts removed on scrotum can potentially do this in the office. Told him to call back if questions or if he wants to proceed. If he has questions for me please add him on for video or in person visit to discuss.

## 2022-06-02 ENCOUNTER — TELEPHONE (OUTPATIENT)
Dept: FAMILY MEDICINE CLINIC | Facility: CLINIC | Age: 57
End: 2022-06-02

## 2022-06-02 NOTE — TELEPHONE ENCOUNTER
Dr Kelly Hooker rec'd page from pt. Pt reports wife and him were exposed sat. Tested + yesterday am via rapid home test  Asks \"Should we take a pcr test?\"    Wife has more sx's, no fever, has cough and aches    Double boosted a week ago    Pt reports only scratchy throat pnd. No other sx's. Discussed supportive care for both and refer to cdc guidelines for quarantine. Advised of urgent care/ER if sx's worsen. He voiced understanding.

## 2022-06-19 DIAGNOSIS — E78.00 PURE HYPERCHOLESTEROLEMIA: ICD-10-CM

## 2022-06-20 RX ORDER — ATORVASTATIN CALCIUM 20 MG/1
20 TABLET, FILM COATED ORAL EVERY EVENING
Qty: 60 TABLET | Refills: 0 | Status: SHIPPED | OUTPATIENT
Start: 2022-06-20 | End: 2022-08-08

## 2022-08-07 DIAGNOSIS — E78.00 PURE HYPERCHOLESTEROLEMIA: ICD-10-CM

## 2022-08-08 ENCOUNTER — PATIENT MESSAGE (OUTPATIENT)
Dept: FAMILY MEDICINE CLINIC | Facility: CLINIC | Age: 57
End: 2022-08-08

## 2022-08-08 RX ORDER — ATORVASTATIN CALCIUM 20 MG/1
TABLET, FILM COATED ORAL
Qty: 60 TABLET | Refills: 0 | Status: SHIPPED | OUTPATIENT
Start: 2022-08-08

## 2022-09-07 ENCOUNTER — EKG ENCOUNTER (OUTPATIENT)
Dept: LAB | Age: 57
End: 2022-09-07
Attending: FAMILY MEDICINE
Payer: COMMERCIAL

## 2022-09-07 DIAGNOSIS — E78.00 PURE HYPERCHOLESTEROLEMIA: ICD-10-CM

## 2022-09-07 LAB
ALBUMIN SERPL-MCNC: 4.2 G/DL (ref 3.4–5)
ALBUMIN/GLOB SERPL: 1.2 {RATIO} (ref 1–2)
ALP LIVER SERPL-CCNC: 58 U/L
ALT SERPL-CCNC: 31 U/L
ANION GAP SERPL CALC-SCNC: 7 MMOL/L (ref 0–18)
AST SERPL-CCNC: 20 U/L (ref 15–37)
BILIRUB SERPL-MCNC: 0.7 MG/DL (ref 0.1–2)
BUN BLD-MCNC: 20 MG/DL (ref 7–18)
BUN/CREAT SERPL: 18 (ref 10–20)
CALCIUM BLD-MCNC: 9.1 MG/DL (ref 8.5–10.1)
CHLORIDE SERPL-SCNC: 106 MMOL/L (ref 98–112)
CHOLEST SERPL-MCNC: 195 MG/DL (ref ?–200)
CO2 SERPL-SCNC: 26 MMOL/L (ref 21–32)
CREAT BLD-MCNC: 1.11 MG/DL
FASTING PATIENT LIPID ANSWER: YES
FASTING STATUS PATIENT QL REPORTED: YES
GFR SERPLBLD BASED ON 1.73 SQ M-ARVRAT: 76 ML/MIN/1.73M2 (ref 60–?)
GLOBULIN PLAS-MCNC: 3.4 G/DL (ref 2.8–4.4)
GLUCOSE BLD-MCNC: 103 MG/DL (ref 70–99)
HDLC SERPL-MCNC: 61 MG/DL (ref 40–59)
LDLC SERPL CALC-MCNC: 116 MG/DL (ref ?–100)
NONHDLC SERPL-MCNC: 134 MG/DL (ref ?–130)
OSMOLALITY SERPL CALC.SUM OF ELEC: 291 MOSM/KG (ref 275–295)
POTASSIUM SERPL-SCNC: 4.5 MMOL/L (ref 3.5–5.1)
PROT SERPL-MCNC: 7.6 G/DL (ref 6.4–8.2)
SODIUM SERPL-SCNC: 139 MMOL/L (ref 136–145)
TRIGL SERPL-MCNC: 101 MG/DL (ref 30–149)
VLDLC SERPL CALC-MCNC: 17 MG/DL (ref 0–30)

## 2022-09-07 PROCEDURE — 80061 LIPID PANEL: CPT | Performed by: FAMILY MEDICINE

## 2022-09-07 PROCEDURE — 80053 COMPREHEN METABOLIC PANEL: CPT | Performed by: FAMILY MEDICINE

## 2022-09-19 ENCOUNTER — LAB ENCOUNTER (OUTPATIENT)
Dept: LAB | Age: 57
End: 2022-09-19
Attending: FAMILY MEDICINE

## 2022-09-19 ENCOUNTER — OFFICE VISIT (OUTPATIENT)
Dept: FAMILY MEDICINE CLINIC | Facility: CLINIC | Age: 57
End: 2022-09-19
Payer: COMMERCIAL

## 2022-09-19 VITALS
HEIGHT: 69 IN | DIASTOLIC BLOOD PRESSURE: 86 MMHG | HEART RATE: 80 BPM | SYSTOLIC BLOOD PRESSURE: 132 MMHG | TEMPERATURE: 98 F | BODY MASS INDEX: 27.55 KG/M2 | WEIGHT: 186 LBS | RESPIRATION RATE: 14 BRPM

## 2022-09-19 DIAGNOSIS — M25.50 ARTHRALGIA, UNSPECIFIED JOINT: ICD-10-CM

## 2022-09-19 DIAGNOSIS — L40.9 PSORIASIS: ICD-10-CM

## 2022-09-19 DIAGNOSIS — M25.561 RIGHT KNEE PAIN, UNSPECIFIED CHRONICITY: ICD-10-CM

## 2022-09-19 DIAGNOSIS — E78.00 PURE HYPERCHOLESTEROLEMIA: Primary | ICD-10-CM

## 2022-09-19 DIAGNOSIS — N52.9 ERECTILE DYSFUNCTION, UNSPECIFIED ERECTILE DYSFUNCTION TYPE: ICD-10-CM

## 2022-09-19 LAB
CRP SERPL-MCNC: <0.29 MG/DL (ref ?–0.3)
ERYTHROCYTE [SEDIMENTATION RATE] IN BLOOD: 6 MM/HR
RHEUMATOID FACT SERPL-ACNC: <10 IU/ML (ref ?–15)

## 2022-09-19 PROCEDURE — 99214 OFFICE O/P EST MOD 30 MIN: CPT | Performed by: FAMILY MEDICINE

## 2022-09-19 PROCEDURE — 3075F SYST BP GE 130 - 139MM HG: CPT | Performed by: FAMILY MEDICINE

## 2022-09-19 PROCEDURE — 3079F DIAST BP 80-89 MM HG: CPT | Performed by: FAMILY MEDICINE

## 2022-09-19 PROCEDURE — 86140 C-REACTIVE PROTEIN: CPT | Performed by: FAMILY MEDICINE

## 2022-09-19 PROCEDURE — 86038 ANTINUCLEAR ANTIBODIES: CPT | Performed by: FAMILY MEDICINE

## 2022-09-19 PROCEDURE — 85652 RBC SED RATE AUTOMATED: CPT | Performed by: FAMILY MEDICINE

## 2022-09-19 PROCEDURE — 86431 RHEUMATOID FACTOR QUANT: CPT | Performed by: FAMILY MEDICINE

## 2022-09-19 PROCEDURE — 3008F BODY MASS INDEX DOCD: CPT | Performed by: FAMILY MEDICINE

## 2022-09-19 RX ORDER — COVID-19 MOLECULAR TEST ASSAY
KIT MISCELLANEOUS
COMMUNITY
Start: 2022-06-01

## 2022-09-19 RX ORDER — TADALAFIL 5 MG/1
5 TABLET ORAL
Qty: 30 TABLET | Refills: 1 | Status: SHIPPED | OUTPATIENT
Start: 2022-09-19

## 2022-09-19 RX ORDER — ATORVASTATIN CALCIUM 20 MG/1
20 TABLET, FILM COATED ORAL EVERY EVENING
Qty: 90 TABLET | Refills: 1 | Status: SHIPPED | OUTPATIENT
Start: 2022-09-19

## 2022-09-19 NOTE — PATIENT INSTRUCTIONS
Continue current meds. Watch diet for fats and carbs. Stay active. Do heart scan. To Additional blood work for evaluation of your joint pains. See rheumatologist for evaluation.

## 2022-09-27 ENCOUNTER — ORDER TRANSCRIPTION (OUTPATIENT)
Dept: ADMINISTRATIVE | Facility: HOSPITAL | Age: 57
End: 2022-09-27

## 2022-09-27 DIAGNOSIS — Z13.9 ENCOUNTER FOR SCREENING: Primary | ICD-10-CM

## 2022-09-29 ENCOUNTER — HOSPITAL ENCOUNTER (OUTPATIENT)
Dept: CT IMAGING | Age: 57
Discharge: HOME | End: 2022-09-29
Attending: FAMILY MEDICINE

## 2022-09-29 DIAGNOSIS — Z13.9 ENCOUNTER FOR SCREENING: ICD-10-CM

## 2022-09-29 NOTE — PROGRESS NOTES
Date of Service 9/29/2022    Major Vazquez  Date of Birth 1/16/1965    Patient Age: 59year old    PCP: MD Trent Quinteros[address] [facility]    Consult Type  Type Scan/Screening: Heart Scan  Preliminary Heart Scan Score: 86.5 (4/14/14 Heart Scan Calcium Score  4.8)                Body Mass Index  There is no height or weight on file to calculate BMI. Lipid Profile  Cholesterol: 195, done on 9/7/2022. HDL Cholesterol: 61, done on 9/7/2022. LDL Cholesterol: 116, done on 9/7/2022. TriGlycerides 101, done on 9/7/2022. LDL goal less than 100. Sometimes with a Calcium Score goal is less than 70. This can help to try and minimize progression of that calcium score. The cholesterol medication works to lower the LDL. Best time to take is at bedtime, as prescribed. In Feb 2022 LDL was 109. Looking back several years your LDL has always been below 100. What has changed? ? The way you are taking medication - cutting in half for a couple of weeks before this last cholesterol check in Sept.  Diet in past may have been a little lower in the Saturated Fats. Ask Dr. Darlene Arias - if you can repeat cholesterol blood work in a couple of months, after taking current Cholesterol dose EXACTLY as prescribed and decreasing some of the Saturated fats, cheese, in your diet. Nurse Review  Risk factor information and results reviewed with Nurse: Yes     /86 no medication. Recommended Follow Up:  Consult your physician regarding[de-identified] Final Heart Scan Report; Discuss potential for Incidental Finding    No data recorded      Recommendations for Change:  Nutrition Changes: Low Saturated Fat;Low Fat Dairy  Cholesterol Modification (goal of therapy depends upon your risk): Decrease LDL (Lousy/Bad) Ideal <100  Exercise: Enhance Current Program  Smoking Cessation: > 1 Year Ago        Repeat Heart Scan: 3 Years if Calcium Score is > 0. 0; Discuss with your Physician          Crispin Rae Resources:  Recommended Resources: Upcoming Classes, Medical Services and [facility]. [facility]. Loni Avalos RN        Please Contact the Nurse Heart Line with any Questions or Concerns [phone].

## 2022-10-03 ENCOUNTER — TELEPHONE (OUTPATIENT)
Dept: FAMILY MEDICINE CLINIC | Facility: CLINIC | Age: 57
End: 2022-10-03

## 2022-10-03 DIAGNOSIS — E78.00 PURE HYPERCHOLESTEROLEMIA: ICD-10-CM

## 2022-10-03 RX ORDER — ATORVASTATIN CALCIUM 20 MG/1
TABLET, FILM COATED ORAL
Qty: 120 TABLET | Refills: 1 | Status: SHIPPED | OUTPATIENT
Start: 2022-10-03

## 2022-10-03 RX ORDER — ASPIRIN 81 MG/1
81 TABLET ORAL DAILY
COMMUNITY
Start: 2022-10-03

## 2022-12-29 DIAGNOSIS — N52.9 ERECTILE DYSFUNCTION, UNSPECIFIED ERECTILE DYSFUNCTION TYPE: ICD-10-CM

## 2023-01-06 RX ORDER — TADALAFIL 5 MG/1
TABLET ORAL
Qty: 30 TABLET | Refills: 0 | Status: SHIPPED | OUTPATIENT
Start: 2023-01-06

## 2023-02-06 DIAGNOSIS — N52.9 ERECTILE DYSFUNCTION, UNSPECIFIED ERECTILE DYSFUNCTION TYPE: ICD-10-CM

## 2023-02-06 RX ORDER — TADALAFIL 5 MG/1
TABLET ORAL
Qty: 30 TABLET | Refills: 0 | Status: SHIPPED | OUTPATIENT
Start: 2023-02-06

## 2023-03-13 DIAGNOSIS — E78.00 PURE HYPERCHOLESTEROLEMIA: ICD-10-CM

## 2023-03-13 RX ORDER — ATORVASTATIN CALCIUM 20 MG/1
TABLET, FILM COATED ORAL
Qty: 120 TABLET | Refills: 0 | Status: SHIPPED | OUTPATIENT
Start: 2023-03-13

## 2023-03-31 ENCOUNTER — PATIENT MESSAGE (OUTPATIENT)
Dept: FAMILY MEDICINE CLINIC | Facility: CLINIC | Age: 58
End: 2023-03-31

## 2023-04-01 NOTE — TELEPHONE ENCOUNTER
From: Mariam Rodríguez  To: Geovanni Gabriel MD  Sent: 3/31/2023 3:31 PM CDT  Subject: Having problems with my jaw    Amna:  Seems like a toothache but now going on 4 weeks and my Dentist cannot find anything wrong with my teeth. Can you refer me to a ENT in your practice that can see me next week please?   Thanks, Ashley Remy [phone]

## 2023-04-17 ENCOUNTER — OFFICE VISIT (OUTPATIENT)
Facility: LOCATION | Age: 58
End: 2023-04-17
Payer: COMMERCIAL

## 2023-04-17 DIAGNOSIS — J34.2 DEVIATED NASAL SEPTUM: ICD-10-CM

## 2023-04-17 DIAGNOSIS — J32.0 SINUSITIS, MAXILLARY, CHRONIC: Primary | ICD-10-CM

## 2023-04-17 PROCEDURE — 99204 OFFICE O/P NEW MOD 45 MIN: CPT | Performed by: OTOLARYNGOLOGY

## 2023-04-17 RX ORDER — FLUTICASONE PROPIONATE 50 MCG
2 SPRAY, SUSPENSION (ML) NASAL DAILY
Qty: 16 G | Refills: 3 | Status: SHIPPED | OUTPATIENT
Start: 2023-04-17

## 2023-04-17 RX ORDER — CEFUROXIME AXETIL 250 MG/1
250 TABLET ORAL 2 TIMES DAILY
Qty: 20 TABLET | Refills: 0 | Status: SHIPPED | OUTPATIENT
Start: 2023-04-17

## 2023-04-18 ENCOUNTER — LAB ENCOUNTER (OUTPATIENT)
Dept: LAB | Age: 58
End: 2023-04-18
Attending: FAMILY MEDICINE
Payer: COMMERCIAL

## 2023-04-18 ENCOUNTER — LAB ENCOUNTER (OUTPATIENT)
Dept: LAB | Age: 58
End: 2023-04-18
Attending: DERMATOLOGY
Payer: COMMERCIAL

## 2023-04-18 DIAGNOSIS — L40.0 PSORIASIS VULGARIS: ICD-10-CM

## 2023-04-18 DIAGNOSIS — L40.0 PSORIASIS VULGARIS: Primary | ICD-10-CM

## 2023-04-18 DIAGNOSIS — E78.00 ELEVATED LDL CHOLESTEROL LEVEL: ICD-10-CM

## 2023-04-18 LAB
ALBUMIN SERPL-MCNC: 4.4 G/DL (ref 3.4–5)
ALBUMIN/GLOB SERPL: 1.2 {RATIO} (ref 1–2)
ALP LIVER SERPL-CCNC: 67 U/L
ALT SERPL-CCNC: 41 U/L
ANION GAP SERPL CALC-SCNC: 3 MMOL/L (ref 0–18)
AST SERPL-CCNC: 26 U/L (ref 15–37)
BASOPHILS # BLD AUTO: 0.07 X10(3) UL (ref 0–0.2)
BASOPHILS NFR BLD AUTO: 1 %
BILIRUB DIRECT SERPL-MCNC: 0.2 MG/DL (ref 0–0.2)
BILIRUB SERPL-MCNC: 0.8 MG/DL (ref 0.1–2)
BUN BLD-MCNC: 15 MG/DL (ref 7–18)
CALCIUM BLD-MCNC: 9.6 MG/DL (ref 8.5–10.1)
CHLORIDE SERPL-SCNC: 106 MMOL/L (ref 98–112)
CHOLEST SERPL-MCNC: 187 MG/DL (ref ?–200)
CO2 SERPL-SCNC: 27 MMOL/L (ref 21–32)
CREAT BLD-MCNC: 1.03 MG/DL
EOSINOPHIL # BLD AUTO: 0.11 X10(3) UL (ref 0–0.7)
EOSINOPHIL NFR BLD AUTO: 1.5 %
ERYTHROCYTE [DISTWIDTH] IN BLOOD BY AUTOMATED COUNT: 12.1 %
FASTING PATIENT LIPID ANSWER: YES
FASTING STATUS PATIENT QL REPORTED: YES
GFR SERPLBLD BASED ON 1.73 SQ M-ARVRAT: 83 ML/MIN/1.73M2 (ref 60–?)
GLOBULIN PLAS-MCNC: 3.7 G/DL (ref 2.8–4.4)
GLUCOSE BLD-MCNC: 90 MG/DL (ref 70–99)
HBV CORE AB SERPL QL IA: NONREACTIVE
HBV SURFACE AB SER QL: NONREACTIVE
HBV SURFACE AB SERPL IA-ACNC: <3.1 MIU/ML
HBV SURFACE AG SER-ACNC: 0.31 [IU]/L
HBV SURFACE AG SERPL QL IA: NONREACTIVE
HCT VFR BLD AUTO: 45.4 %
HCV AB SERPL QL IA: NONREACTIVE
HDLC SERPL-MCNC: 60 MG/DL (ref 40–59)
HGB BLD-MCNC: 15.2 G/DL
IMM GRANULOCYTES # BLD AUTO: 0.09 X10(3) UL (ref 0–1)
IMM GRANULOCYTES NFR BLD: 1.2 %
LDLC SERPL CALC-MCNC: 97 MG/DL (ref ?–100)
LYMPHOCYTES # BLD AUTO: 1.76 X10(3) UL (ref 1–4)
LYMPHOCYTES NFR BLD AUTO: 24.3 %
MCH RBC QN AUTO: 30.6 PG (ref 26–34)
MCHC RBC AUTO-ENTMCNC: 33.5 G/DL (ref 31–37)
MCV RBC AUTO: 91.3 FL
MONOCYTES # BLD AUTO: 0.46 X10(3) UL (ref 0.1–1)
MONOCYTES NFR BLD AUTO: 6.4 %
NEUTROPHILS # BLD AUTO: 4.74 X10 (3) UL (ref 1.5–7.7)
NEUTROPHILS # BLD AUTO: 4.74 X10(3) UL (ref 1.5–7.7)
NEUTROPHILS NFR BLD AUTO: 65.6 %
NONHDLC SERPL-MCNC: 127 MG/DL (ref ?–130)
OSMOLALITY SERPL CALC.SUM OF ELEC: 282 MOSM/KG (ref 275–295)
PLATELET # BLD AUTO: 180 10(3)UL (ref 150–450)
POTASSIUM SERPL-SCNC: 4.4 MMOL/L (ref 3.5–5.1)
PROT SERPL-MCNC: 8.1 G/DL (ref 6.4–8.2)
RBC # BLD AUTO: 4.97 X10(6)UL
SODIUM SERPL-SCNC: 136 MMOL/L (ref 136–145)
TRIGL SERPL-MCNC: 176 MG/DL (ref 30–149)
VLDLC SERPL CALC-MCNC: 29 MG/DL (ref 0–30)
WBC # BLD AUTO: 7.2 X10(3) UL (ref 4–11)

## 2023-04-18 PROCEDURE — 86803 HEPATITIS C AB TEST: CPT

## 2023-04-18 PROCEDURE — 80053 COMPREHEN METABOLIC PANEL: CPT

## 2023-04-18 PROCEDURE — 87340 HEPATITIS B SURFACE AG IA: CPT

## 2023-04-18 PROCEDURE — 82248 BILIRUBIN DIRECT: CPT

## 2023-04-18 PROCEDURE — 86480 TB TEST CELL IMMUN MEASURE: CPT

## 2023-04-18 PROCEDURE — 86706 HEP B SURFACE ANTIBODY: CPT

## 2023-04-18 PROCEDURE — 85025 COMPLETE CBC W/AUTO DIFF WBC: CPT

## 2023-04-18 PROCEDURE — 86704 HEP B CORE ANTIBODY TOTAL: CPT

## 2023-04-18 PROCEDURE — 80061 LIPID PANEL: CPT

## 2023-04-20 LAB
M TB IFN-G CD4+ T-CELLS BLD-ACNC: 0.01 IU/ML
M TB TUBERC IFN-G BLD QL: NEGATIVE
M TB TUBERC IGNF/MITOGEN IGNF CONTROL: >10 IU/ML
QFT TB1 AG MINUS NIL: -0.01 IU/ML
QFT TB2 AG MINUS NIL: -0.01 IU/ML

## 2023-05-10 ENCOUNTER — OFFICE VISIT (OUTPATIENT)
Facility: LOCATION | Age: 58
End: 2023-05-10
Payer: COMMERCIAL

## 2023-05-10 DIAGNOSIS — J34.2 DEVIATED NASAL SEPTUM: ICD-10-CM

## 2023-05-10 DIAGNOSIS — J32.1 CHRONIC FRONTAL SINUSITIS: ICD-10-CM

## 2023-05-10 DIAGNOSIS — J32.0 SINUSITIS, MAXILLARY, CHRONIC: Primary | ICD-10-CM

## 2023-05-10 PROCEDURE — 77011 CT SCAN FOR LOCALIZATION: CPT | Performed by: OTOLARYNGOLOGY

## 2023-05-10 PROCEDURE — 99214 OFFICE O/P EST MOD 30 MIN: CPT | Performed by: OTOLARYNGOLOGY

## 2023-05-10 RX ORDER — AMOXICILLIN AND CLAVULANATE POTASSIUM 875; 125 MG/1; MG/1
1 TABLET, FILM COATED ORAL EVERY 12 HOURS
Qty: 20 TABLET | Refills: 0 | Status: SHIPPED | OUTPATIENT
Start: 2023-05-10

## 2023-05-10 RX ORDER — FLUTICASONE PROPIONATE 50 MCG
2 SPRAY, SUSPENSION (ML) NASAL DAILY
Qty: 16 G | Refills: 3 | Status: SHIPPED | OUTPATIENT
Start: 2023-05-10

## 2023-05-10 RX ORDER — METHYLPREDNISOLONE 4 MG/1
TABLET ORAL
Qty: 21 TABLET | Refills: 0 | Status: SHIPPED | OUTPATIENT
Start: 2023-05-10

## 2023-05-24 ENCOUNTER — OFFICE VISIT (OUTPATIENT)
Dept: FAMILY MEDICINE CLINIC | Facility: CLINIC | Age: 58
End: 2023-05-24
Payer: COMMERCIAL

## 2023-05-24 ENCOUNTER — LAB ENCOUNTER (OUTPATIENT)
Dept: LAB | Age: 58
End: 2023-05-24
Attending: FAMILY MEDICINE
Payer: COMMERCIAL

## 2023-05-24 VITALS
DIASTOLIC BLOOD PRESSURE: 86 MMHG | SYSTOLIC BLOOD PRESSURE: 130 MMHG | HEIGHT: 69 IN | WEIGHT: 183 LBS | HEART RATE: 82 BPM | RESPIRATION RATE: 16 BRPM | TEMPERATURE: 98 F | BODY MASS INDEX: 27.11 KG/M2

## 2023-05-24 DIAGNOSIS — Z00.00 PHYSICAL EXAM, ANNUAL: ICD-10-CM

## 2023-05-24 DIAGNOSIS — Z12.5 SCREENING FOR PROSTATE CANCER: ICD-10-CM

## 2023-05-24 DIAGNOSIS — Z13.89 SCREENING FOR GENITOURINARY CONDITION: ICD-10-CM

## 2023-05-24 DIAGNOSIS — Z00.00 LABORATORY EXAMINATION ORDERED AS PART OF A ROUTINE GENERAL MEDICAL EXAMINATION: ICD-10-CM

## 2023-05-24 DIAGNOSIS — Z00.00 PHYSICAL EXAM, ANNUAL: Primary | ICD-10-CM

## 2023-05-24 DIAGNOSIS — E78.00 PURE HYPERCHOLESTEROLEMIA: ICD-10-CM

## 2023-05-24 LAB
BILIRUB UR QL STRIP.AUTO: NEGATIVE
COLOR UR AUTO: YELLOW
COMPLEXED PSA SERPL-MCNC: 2.15 NG/ML (ref ?–4)
GLUCOSE UR STRIP.AUTO-MCNC: NEGATIVE MG/DL
LEUKOCYTE ESTERASE UR QL STRIP.AUTO: NEGATIVE
NITRITE UR QL STRIP.AUTO: NEGATIVE
PH UR STRIP.AUTO: 5 [PH] (ref 5–8)
PROT UR STRIP.AUTO-MCNC: NEGATIVE MG/DL
RBC UR QL AUTO: NEGATIVE
SP GR UR STRIP.AUTO: 1.03 (ref 1–1.03)
TSI SER-ACNC: 0.87 MIU/ML (ref 0.36–3.74)
UROBILINOGEN UR STRIP.AUTO-MCNC: <2 MG/DL

## 2023-05-24 PROCEDURE — 84443 ASSAY THYROID STIM HORMONE: CPT

## 2023-05-24 PROCEDURE — 3008F BODY MASS INDEX DOCD: CPT | Performed by: FAMILY MEDICINE

## 2023-05-24 PROCEDURE — 99396 PREV VISIT EST AGE 40-64: CPT | Performed by: FAMILY MEDICINE

## 2023-05-24 PROCEDURE — 81001 URINALYSIS AUTO W/SCOPE: CPT

## 2023-05-24 PROCEDURE — 3079F DIAST BP 80-89 MM HG: CPT | Performed by: FAMILY MEDICINE

## 2023-05-24 PROCEDURE — 3075F SYST BP GE 130 - 139MM HG: CPT | Performed by: FAMILY MEDICINE

## 2023-05-24 RX ORDER — CIMETIDINE 400 MG/1
400 TABLET, FILM COATED ORAL 2 TIMES DAILY
COMMUNITY
Start: 2023-03-25

## 2023-05-24 RX ORDER — TAPINAROF 10 MG/1000MG
CREAM TOPICAL
COMMUNITY
Start: 2023-04-27

## 2023-05-24 NOTE — PATIENT INSTRUCTIONS
Shingrix shingles vaccination check if covered by the insurance. Healthy diet. Stay active. Schedule medication visit for October. Do fasting blood work prior to that visit.

## 2023-05-27 DIAGNOSIS — N52.9 ERECTILE DYSFUNCTION, UNSPECIFIED ERECTILE DYSFUNCTION TYPE: ICD-10-CM

## 2023-05-31 RX ORDER — TADALAFIL 5 MG/1
TABLET ORAL
Qty: 30 TABLET | Refills: 0 | Status: SHIPPED | OUTPATIENT
Start: 2023-05-31

## 2023-05-31 NOTE — TELEPHONE ENCOUNTER
Pt's last OV was on 1/24/22. Pt has follow up on 7/5/23. Refill for Tadalafil. NIKKI Horta-Please advise.

## 2023-07-01 DIAGNOSIS — E78.00 PURE HYPERCHOLESTEROLEMIA: ICD-10-CM

## 2023-07-03 RX ORDER — ATORVASTATIN CALCIUM 20 MG/1
TABLET, FILM COATED ORAL
Qty: 120 TABLET | Refills: 1 | Status: SHIPPED | OUTPATIENT
Start: 2023-07-03

## 2023-07-05 ENCOUNTER — OFFICE VISIT (OUTPATIENT)
Dept: SURGERY | Facility: CLINIC | Age: 58
End: 2023-07-05

## 2023-07-05 DIAGNOSIS — N13.8 BPH WITH OBSTRUCTION/LOWER URINARY TRACT SYMPTOMS: ICD-10-CM

## 2023-07-05 DIAGNOSIS — N50.3 EPIDIDYMAL CYST: ICD-10-CM

## 2023-07-05 DIAGNOSIS — N52.9 ERECTILE DYSFUNCTION, UNSPECIFIED ERECTILE DYSFUNCTION TYPE: Primary | ICD-10-CM

## 2023-07-05 DIAGNOSIS — N40.1 BPH WITH OBSTRUCTION/LOWER URINARY TRACT SYMPTOMS: ICD-10-CM

## 2023-07-05 DIAGNOSIS — L72.3 SCROTAL SEBACEOUS CYST: ICD-10-CM

## 2023-07-05 LAB
APPEARANCE: CLEAR
BILIRUBIN: NEGATIVE
GLUCOSE (URINE DIPSTICK): NEGATIVE MG/DL
KETONES (URINE DIPSTICK): NEGATIVE MG/DL
LEUKOCYTES: NEGATIVE
MULTISTIX LOT#: NORMAL NUMERIC
NITRITE, URINE: NEGATIVE
OCCULT BLOOD: NEGATIVE
PH, URINE: 5.5 (ref 4.5–8)
PROTEIN (URINE DIPSTICK): NEGATIVE MG/DL
SPECIFIC GRAVITY: 1.02 (ref 1–1.03)
URINE-COLOR: YELLOW
UROBILINOGEN,SEMI-QN: 0.2 MG/DL (ref 0–1.9)

## 2023-07-05 PROCEDURE — 99214 OFFICE O/P EST MOD 30 MIN: CPT | Performed by: UROLOGY

## 2023-07-05 PROCEDURE — 81003 URINALYSIS AUTO W/O SCOPE: CPT | Performed by: UROLOGY

## 2023-07-05 RX ORDER — DIAZEPAM 10 MG/1
10 TABLET ORAL SEE ADMIN INSTRUCTIONS
Qty: 2 TABLET | Refills: 0 | Status: SHIPPED | OUTPATIENT
Start: 2023-07-05

## 2023-07-05 RX ORDER — TADALAFIL 5 MG/1
TABLET ORAL
Qty: 30 TABLET | Refills: 11 | Status: SHIPPED | OUTPATIENT
Start: 2023-07-05

## 2023-07-05 RX ORDER — TRAMADOL HYDROCHLORIDE 50 MG/1
50 TABLET ORAL EVERY 6 HOURS PRN
Qty: 15 TABLET | Refills: 0 | Status: SHIPPED | OUTPATIENT
Start: 2023-07-05

## 2023-07-12 ENCOUNTER — PATIENT MESSAGE (OUTPATIENT)
Dept: FAMILY MEDICINE CLINIC | Facility: CLINIC | Age: 58
End: 2023-07-12

## 2023-07-12 NOTE — TELEPHONE ENCOUNTER
From: Clarissa Hawkins  To: Adan Cardozo MD  Sent: 7/12/2023 8:58 AM CDT  Subject: Woke up with significant bruiwse on my breast and a lump nearby    Amna:  On Thursday July 6th I woke up and noticed a large (3\"diam) circular bruise purple/red and yelllow, with a small lump in the [place] (not right in middle though off to 1 side). It has not changed much in several days now and thought it might be a bug bite, and go away, not painful though so not too concerned yet. Any thoughts on what it might be? Let me know if you want me to take a pik of it for you. Thanks. .. [phone]

## 2023-07-13 NOTE — TELEPHONE ENCOUNTER
Pt. Called back and stated symptoms are same as below. No new symptoms but not any better. Please advise. Thank you!

## 2023-07-21 NOTE — PROGRESS NOTES
HPI:     Axel Klinefelter is a 59year old male with a PMH of HL, psoriasis, LBP. Following for:  1. ED  - 5 mg cialis prn  2. left epididymal cyst  3. Scrotal inclusion cysts  4. Fam h/o Cap  - dad dx with mCaP age 76  10. BPH/LUTS    PCP - Straczysnki  LOV 22    Presents for check-up and removal of scrotal wall cysts. Scrotal cysts are slightly enlarged and bothersome. First noted when he was a teen but has slowly enlarged. Non-tender. No drainage. AUA SS is  with 2 n; 1 w, I, f. Mostly happy with LUTS and declines meds. Incontinence: none  Penoscrotal: two inclusion cysts on left side of scrotum -> ~ 8 and 12 mm in size. Mobile, non-tender. Stable, soft ~ 2 cm epididymal cyst on left which is also non-tender. Also has two tiny (~ 1 mm) skin tags on left side of scrotum. ELIEL deferred. UA is negative    Gross hematuria: none  Tobacco hx: none  Kidney stone hx: none  Fam h/o  malignancy: dad  from [address] (dx 77)    PSA 2.15 23    Scrotal US 22: 2.3 x 2.3 cm complex left epididymal head cyst, large left varicocele    Good potency with 5 mg cialis prn ([place])    Procedure performed without issue. Will continue 5 mg cialis prn, observation for BPH/LUTS. Can potentially f/u prn and have PCP refill cialis if he'd like or with me or PA in 1 y. PROCEDURE NOTE    PREOPERATIVE DIAGNOSIS: scrotal wall cysts    POSTOPERATIVE DIAGNOSIS: same    PROCEDURE PERFORMED: Excision of scrotal wall cyst x 2 and skin tag x 2    ANESTHESIA: 1-% lidocaine without epinephrine injectable. INDICATIONS:  He desires removal of scrotal wall cyst x 2 and skin tag x 2. We discussed the risks and benefits to the procedure including, but not limited to, bleeding, infection, possible damage to surrounding structures. The patient understands and would like to proceed. DESCRIPTION OF THE PROCEDURE:      He was placed in the supine position, prepped and draped in usual sterile fashion using Betadine.  2% lidocaine without epinephrine was used for local anesthetic. The cysts were ~ 12 mm and 8 mm in size. The skin tags were both ~ 2 mm in size. The cysts and skin tags were identified and excised using a 15 blade scalpel. The base of the wound was cauterized and coated with Dermabond glue and allowed to heal via secondary intent. No oozing or other abnormalities were noted. The patient tolerated the procedure well and left in satisfactory condition without complaints.  __________________________________________      HISTORY:  Past Medical History:   Diagnosis Date    Glossitis     Glossodynia     High cholesterol     Hypercholesterolemia     Orchitis and epididymitis, unspecified     Psoriasis       Past Surgical History:   Procedure Laterality Date    COLONOSCOPY  2009    normal    FEMUR/KNEE SURG UNLISTED      arthroscopy knee left    KNEE ARTHROSCOPY      KNEE SCOPE,MED/LAT MENISECTOMY Left 1/14/2015    Procedure: ARTHROSCOPY LEFT KNEE AND DEBRIDEMENT W/ MEDIAL MENISCECTOMY;  Surgeon: Shanell Ansari MD;  [facility]    OTHER SURGICAL HISTORY      injection of neurolytic substance epidural    OTHER SURGICAL HISTORY  2013    right knee scope      Family History   Problem Relation Age of Onset    Cancer Father 77        Prostate    Other (Other) Maternal Grandfather         liver cirrhosis    Other (Other) Paternal Grandfather         liver cirrhosis    Other (Other) Mother         hearing loss    Other (aaa) Mother         AAA    Heart Disorder Maternal Aunt         MI      Social History:   Social History     Socioeconomic History    Marital status:    Tobacco Use    Smoking status: Former     Types: Cigars    Smokeless tobacco: Never   Vaping Use    Vaping Use: Never used   Substance and Sexual Activity    Alcohol use:  Yes     Alcohol/week: 0.0 standard drinks of alcohol     Comment: 3 times a week    Drug use: No    Sexual activity: Yes     Partners: Female   Other Topics Concern Caffeine Concern No     Comment: 24 oz    Exercise Yes     Comment: regular     Seat Belt Yes        Medications (Active prior to today's visit):  Current Outpatient Medications   Medication Sig Dispense Refill    diazePAM (VALIUM) 10 MG Oral Tab Take 1 tablet (10 mg total) by mouth See Admin Instructions. Take 1-2 tablets by mouth 20-30 minutes before procedure. 2 tablet 0    traMADol 50 MG Oral Tab Take 1 tablet (50 mg total) by mouth every 6 (six) hours as needed for Pain. 15 tablet 0    tadalafil 5 MG Oral Tab TAKE ONE TABLET BY MOUTH DAILY AS NEEDED. NO ADDITIONAL REFILLS UNTIL AFTER FOLLOW UP VISIT 30 tablet 11    atorvastatin 20 MG Oral Tab Take 2 tablets by mouth nightly on Monday, Wednesday, Friday and 1 tablet nightly all other days 120 tablet 1    cimetidine 400 MG Oral Tab Take 1 tablet (400 mg total) by mouth 2 (two) times daily. VTAMA 1 % External Cream Apply to affected area throughout body once daily. fluticasone propionate 50 MCG/ACT Nasal Suspension 2 sprays by Nasal route daily. 16 g 3    aspirin 81 MG Oral Tab EC Take 1 tablet (81 mg total) by mouth daily. ibuprofen 200 MG Oral Tab Take 2 tablets (400 mg total) by mouth every 6 (six) hours as needed for Pain. Multiple Vitamins-Minerals (MULTIVITAMIN ADULTS 50+) Oral Tab Take 1 tablet by mouth daily. Allergies:    Mold                      Pollen                        ROS:     A comprehensive 10 point review of systems was completed. Pertinent positives and negatives noted in the the HPI.     PHYSICAL EXAM:     GENERAL APPEARANCE: well, developed, well nourished, in no acute distress  NEUROLOGIC: nonfocal, alert and oriented  HEAD: normocephalic, atraumatic  EYES: sclera non-icteric  EARS: hearing intact  ORAL CAVITY: mucosa moist  NECK/THYROID: no obvious goiter or masses  LUNGS: nonlabored breathing  ABDOMEN: soft, no obvious masses or tenderness  SKIN: no obvious rashes    : as noted above    ASSESSMENT/PLAN: Diagnoses and all orders for this visit:    Scrotal sebaceous cyst  -     SURGICAL PATHOLOGY TISSUE  -     SURGICAL PATHOLOGY TISSUE    BPH with obstruction/lower urinary tract symptoms    Erectile dysfunction, unspecified erectile dysfunction type    - as noted above. Thanks again for this consult.       Kavon Vazquez MD, [facility]  Urologist  [place] [id number]  Office: [phone]

## 2023-08-04 ENCOUNTER — PROCEDURE (OUTPATIENT)
Dept: SURGERY | Facility: CLINIC | Age: 58
End: 2023-08-04

## 2023-08-04 VITALS — DIASTOLIC BLOOD PRESSURE: 85 MMHG | SYSTOLIC BLOOD PRESSURE: 154 MMHG | HEART RATE: 80 BPM

## 2023-08-04 DIAGNOSIS — L72.3 SCROTAL SEBACEOUS CYST: Primary | ICD-10-CM

## 2023-08-04 DIAGNOSIS — N52.9 ERECTILE DYSFUNCTION, UNSPECIFIED ERECTILE DYSFUNCTION TYPE: ICD-10-CM

## 2023-08-04 DIAGNOSIS — N40.1 BPH WITH OBSTRUCTION/LOWER URINARY TRACT SYMPTOMS: ICD-10-CM

## 2023-08-04 DIAGNOSIS — N13.8 BPH WITH OBSTRUCTION/LOWER URINARY TRACT SYMPTOMS: ICD-10-CM

## 2023-08-04 PROCEDURE — 99213 OFFICE O/P EST LOW 20 MIN: CPT | Performed by: UROLOGY

## 2023-08-04 PROCEDURE — 3079F DIAST BP 80-89 MM HG: CPT | Performed by: UROLOGY

## 2023-08-04 PROCEDURE — 11422 EXC H-F-NK-SP B9+MARG 1.1-2: CPT | Performed by: UROLOGY

## 2023-08-04 PROCEDURE — 3077F SYST BP >= 140 MM HG: CPT | Performed by: UROLOGY

## 2023-08-04 PROCEDURE — 11200 RMVL SKIN TAGS UP TO&INC 15: CPT | Performed by: UROLOGY

## 2023-08-05 ENCOUNTER — HOSPITAL ENCOUNTER (OUTPATIENT)
Age: 58
Discharge: HOME | End: 2023-08-05
Attending: EMERGENCY MEDICINE
Payer: COMMERCIAL

## 2023-08-05 ENCOUNTER — PATIENT MESSAGE (OUTPATIENT)
Dept: SURGERY | Facility: CLINIC | Age: 58
End: 2023-08-05

## 2023-08-05 VITALS
SYSTOLIC BLOOD PRESSURE: 156 MMHG | TEMPERATURE: 99 F | DIASTOLIC BLOOD PRESSURE: 90 MMHG | WEIGHT: 182 LBS | BODY MASS INDEX: 26.96 KG/M2 | RESPIRATION RATE: 20 BRPM | HEIGHT: 69 IN | HEART RATE: 80 BPM | OXYGEN SATURATION: 98 %

## 2023-08-05 DIAGNOSIS — M65.319 TRIGGER FINGER OF THUMB, UNSPECIFIED LATERALITY: ICD-10-CM

## 2023-08-05 DIAGNOSIS — T14.8XXA BLEEDING FROM WOUND: Primary | ICD-10-CM

## 2023-08-05 PROCEDURE — 99213 OFFICE O/P EST LOW 20 MIN: CPT

## 2023-08-05 NOTE — ED INITIAL ASSESSMENT (HPI)
Had 2 cysts removed from the scrotum. Mid scrotal wound has been bleeding. Pt states he sent a message via My Chart to his physician. For 2 weeks, c/o a locked right thumb. C/o thumb pain.  Took 2 Excedrin this am.

## 2023-08-05 NOTE — DISCHARGE INSTRUCTIONS
Leave the entire dressing in place overnight. Starting tomorrow, change only the outer dressing - tape and bulky gauze. Leave the gelfoam in place. If the Gelfoam does fall off inadvertently and you see further bleeding, break off a small piece of the unused Gelfoam, and placed in the wound as instructed. Cover it again with a bulky gauze and tape. Make sure the tape does not cover any of the other wound areas. No excessive walking. No clothes that can irritate the area. Wear supportive briefs.   Return for any problems

## 2023-08-07 ENCOUNTER — OFFICE VISIT (OUTPATIENT)
Dept: SURGERY | Facility: CLINIC | Age: 58
End: 2023-08-07

## 2023-08-07 ENCOUNTER — TELEPHONE (OUTPATIENT)
Dept: SURGERY | Facility: CLINIC | Age: 58
End: 2023-08-07

## 2023-08-07 DIAGNOSIS — R58 BLEEDING: Primary | ICD-10-CM

## 2023-08-07 RX ORDER — AMOXICILLIN AND CLAVULANATE POTASSIUM 875; 125 MG/1; MG/1
1 TABLET, FILM COATED ORAL 2 TIMES DAILY
Qty: 14 TABLET | Refills: 0 | Status: SHIPPED | OUTPATIENT
Start: 2023-08-07 | End: 2023-08-14

## 2023-08-07 NOTE — PROGRESS NOTES
HPI:     Axel Klinefelter is a 59year old male with a PMH of HL, psoriasis, LBP. Following for:  1. ED  - 5 mg cialis prn  2. left epididymal cyst  3. Scrotal inclusion cysts  4. Fam h/o Cap  - dad dx with mCaP age 76  10. BPH/LUTS    PCP - Straczysnki  LOV 22    Presents with scrotal bleeding. We removed scrotal cysts on Friday. Is taking 81 mg ASA. Bleeding began ~ 4 hours after procedure. Noted to have active oozing from the superior wound    AUA SS is  with 2 n; 1 w, I, f. Mostly happy with LUTS and declines meds. Incontinence: none  Penoscrotal: two inclusion cysts on left side of scrotum -> ~ 8 and 12 mm in size. Mobile, non-tender. Stable, soft ~ 2 cm epididymal cyst on left which is also non-tender. Also has two tiny (~ 1 mm) skin tags on left side of scrotum. ELIEL deferred. UA is negative    Gross hematuria: none  Tobacco hx: none  Kidney stone hx: none  Fam h/o  malignancy: dad  from [address] (dx 75)    PSA 2.15 23    Scrotal US 22: 2.3 x 2.3 cm complex left epididymal head cyst, large left varicocele    Good potency with 5 mg cialis prn (Ong)    Discussed options and he wants to proceed with cauterization of bleeding vessels today. Scrotum prepped and draped. ~ 20 mL lidocaine used for local anesthetic. Both wounds were extensively cauterized with Bovie. Wound coated with Dermabond glue. No residual oozing noted at end of procedure. Procedure performed without issue. Will continue 5 mg cialis prn, observation for BPH/LUTS. 7 days antibiotics. Can potentially f/u prn and have PCP refill cialis if he'd like or with me or PA in 1 y.      HISTORY:  Past Medical History:   Diagnosis Date    Glossitis     Glossodynia     High cholesterol     Hypercholesterolemia     Orchitis and epididymitis, unspecified     Psoriasis       Past Surgical History:   Procedure Laterality Date    COLONOSCOPY      normal    FEMUR/KNEE SURG UNLISTED      arthroscopy knee left    KNEE ARTHROSCOPY      KNEE SCOPE,MED/LAT MENISECTOMY Left 1/14/2015    Procedure: ARTHROSCOPY LEFT KNEE AND DEBRIDEMENT W/ MEDIAL MENISCECTOMY;  Surgeon: Piper Mckenzie MD;  Location: [facility]    OTHER SURGICAL HISTORY      injection of neurolytic substance epidural    OTHER SURGICAL HISTORY  2013    right knee scope      Family History   Problem Relation Age of Onset    Cancer Father 77        Prostate    Other (Other) Maternal Grandfather         liver cirrhosis    Other (Other) Paternal Grandfather         liver cirrhosis    Other (Other) Mother         hearing loss    Other (aaa) Mother         AAA    Heart Disorder Maternal Aunt         MI      Social History:   Social History     Socioeconomic History    Marital status:    Tobacco Use    Smoking status: Former     Types: Cigars    Smokeless tobacco: Never   Vaping Use    Vaping Use: Never used   Substance and Sexual Activity    Alcohol use: Yes     Alcohol/week: 0.0 standard drinks of alcohol     Comment: 3 times a week    Drug use: No    Sexual activity: Yes     Partners: Female   Other Topics Concern    Caffeine Concern No     Comment: 24 oz    Exercise Yes     Comment: regular     Seat Belt Yes        Medications (Active prior to today's visit):  Current Outpatient Medications   Medication Sig Dispense Refill    amoxicillin clavulanate 875-125 MG Oral Tab Take 1 tablet by mouth 2 (two) times daily for 7 days. 14 tablet 0    diazePAM (VALIUM) 10 MG Oral Tab Take 1 tablet (10 mg total) by mouth See Admin Instructions. Take 1-2 tablets by mouth 20-30 minutes before procedure. (Patient not taking: Reported on 8/5/2023) 2 tablet 0    traMADol 50 MG Oral Tab Take 1 tablet (50 mg total) by mouth every 6 (six) hours as needed for Pain. 15 tablet 0    tadalafil 5 MG Oral Tab TAKE ONE TABLET BY MOUTH DAILY AS NEEDED.  NO ADDITIONAL REFILLS UNTIL AFTER FOLLOW UP VISIT 30 tablet 11    atorvastatin 20 MG Oral Tab Take 2 tablets by mouth nightly on Monday, Wednesday, Friday and 1 tablet nightly all other days 120 tablet 1    cimetidine 400 MG Oral Tab Take 1 tablet (400 mg total) by mouth 2 (two) times daily. VTAMA 1 % External Cream Apply to affected area throughout body once daily. fluticasone propionate 50 MCG/ACT Nasal Suspension 2 sprays by Nasal route daily. 16 g 3    aspirin 81 MG Oral Tab EC Take 1 tablet (81 mg total) by mouth daily. ibuprofen 200 MG Oral Tab Take 2 tablets (400 mg total) by mouth every 6 (six) hours as needed for Pain. Multiple Vitamins-Minerals (MULTIVITAMIN ADULTS 50+) Oral Tab Take 1 tablet by mouth daily. Allergies:    Mold                      Pollen                        ROS:     A comprehensive 10 point review of systems was completed. Pertinent positives and negatives noted in the the HPI. PHYSICAL EXAM:     GENERAL APPEARANCE: well, developed, well nourished, in no acute distress  NEUROLOGIC: nonfocal, alert and oriented  HEAD: normocephalic, atraumatic  EYES: sclera non-icteric  EARS: hearing intact  ORAL CAVITY: mucosa moist  NECK/THYROID: no obvious goiter or masses  LUNGS: nonlabored breathing  ABDOMEN: soft, no obvious masses or tenderness  SKIN: no obvious rashes    : as noted above    ASSESSMENT/PLAN:   Diagnoses and all orders for this visit:    Bleeding    Other orders  -     amoxicillin clavulanate 875-125 MG Oral Tab; Take 1 tablet by mouth 2 (two) times daily for 7 days. - as noted above. Thanks again for this consult.       Brandi Dodd MD, [address]  Urologist  [facility]  Office: [phone]

## 2023-08-07 NOTE — TELEPHONE ENCOUNTER
From: Radha Nicholson  To: Fouzia Garcia MD  Sent: 8/5/2023 9:53 AM CDT  Subject: Bleeding from scrotum significantly    Dr Hilton Flores:  After the procedure yestrday i began to notice some slight bleeding a few hours after it, then bleeding covered my underwear. 3 changes since then and significant bleeding continues. It looks to be just running from the largest incision. What shoudl I do? Go to urgent care or simply wiat it out? Please advise [phone].   Thanks, Armin Mayers

## 2023-08-07 NOTE — TELEPHONE ENCOUNTER
Patient calling back again. States he is bleeding a lot and needs answers. Tried reaching RN station and triage and no answer. Patient really upset.  Please advise

## 2023-08-08 ENCOUNTER — OFFICE VISIT (OUTPATIENT)
Dept: FAMILY MEDICINE CLINIC | Facility: CLINIC | Age: 58
End: 2023-08-08
Payer: COMMERCIAL

## 2023-08-08 VITALS
BODY MASS INDEX: 27.52 KG/M2 | HEIGHT: 69 IN | TEMPERATURE: 98 F | DIASTOLIC BLOOD PRESSURE: 78 MMHG | RESPIRATION RATE: 14 BRPM | SYSTOLIC BLOOD PRESSURE: 128 MMHG | WEIGHT: 185.81 LBS | HEART RATE: 60 BPM

## 2023-08-08 DIAGNOSIS — Z87.09 HISTORY OF SINUSITIS: ICD-10-CM

## 2023-08-08 DIAGNOSIS — M79.644 PAIN OF RIGHT THUMB: Primary | ICD-10-CM

## 2023-08-08 DIAGNOSIS — R68.84 JAW PAIN: ICD-10-CM

## 2023-08-08 DIAGNOSIS — T14.8XXA BRUISE: ICD-10-CM

## 2023-08-08 PROCEDURE — 3078F DIAST BP <80 MM HG: CPT | Performed by: FAMILY MEDICINE

## 2023-08-08 PROCEDURE — 99214 OFFICE O/P EST MOD 30 MIN: CPT | Performed by: FAMILY MEDICINE

## 2023-08-08 PROCEDURE — 3074F SYST BP LT 130 MM HG: CPT | Performed by: FAMILY MEDICINE

## 2023-08-08 PROCEDURE — 3008F BODY MASS INDEX DOCD: CPT | Performed by: FAMILY MEDICINE

## 2023-08-08 RX ORDER — AMOXICILLIN AND CLAVULANATE POTASSIUM 875; 125 MG/1; MG/1
1 TABLET, FILM COATED ORAL 2 TIMES DAILY
Qty: 20 TABLET | Refills: 0 | Status: SHIPPED | OUTPATIENT
Start: 2023-08-08 | End: 2023-08-18

## 2023-08-08 NOTE — PATIENT INSTRUCTIONS
Stop the amoxicillin. Start augmentin. Call your dentist to let him/her know that your tooth is now sensitive to hot and cold.

## 2023-08-15 ENCOUNTER — TELEPHONE (OUTPATIENT)
Dept: ORTHOPEDICS CLINIC | Facility: CLINIC | Age: 58
End: 2023-08-15

## 2023-08-15 DIAGNOSIS — G89.29 CHRONIC PAIN OF RIGHT THUMB: Primary | ICD-10-CM

## 2023-08-15 DIAGNOSIS — M79.644 THUMB PAIN, RIGHT: ICD-10-CM

## 2023-08-15 DIAGNOSIS — M79.644 CHRONIC PAIN OF RIGHT THUMB: Primary | ICD-10-CM

## 2023-08-15 NOTE — TELEPHONE ENCOUNTER
Patient has an upcoming appointment for right thumb pain. Patient has not had imaging done. Please  place RX accordingly.     Future Appointments   Date Time Provider Paul Denisse   9/25/2023 11:00 AM Yamini Jasso MD [facility] [id number]

## 2023-08-18 ENCOUNTER — TELEPHONE (OUTPATIENT)
Dept: FAMILY MEDICINE CLINIC | Facility: CLINIC | Age: 58
End: 2023-08-18

## 2023-08-18 NOTE — TELEPHONE ENCOUNTER
LOV 8/8 for exam of lump/bump on chest. Pt states by the time he was able to get in for an appointment (10 days after initial apt request) the bump had gone away. Pt indicates bump has returned today,however now on opposite breast.    Pt requesting to be seen ASAP, today, if possible by either Dr Carrington Moss or Celestina Moss for evaluation. Please advise if possible to overbook for evaluation?

## 2023-08-18 NOTE — TELEPHONE ENCOUNTER
Pt reports lump was in right breast before with bruise 10 days ago. Resolved. Lump has returned and now it's on the Left breast, bigger lump smaller bruise started yesterday morning. Pt c/o soreness 2/10. Pt takes Advil PRN. Pt denies trauma/injury in affected area. Pt denies taking blood thinner. Pt denies having open wound to area. Pt denies having SOB/Chest pain. Pt voiced concern and asking to be seen today. Dr. Anabella Lamar, Will you be able to work patient in today? Please advise. Thanks.

## 2023-08-18 NOTE — TELEPHONE ENCOUNTER
I can see him on Monday at 8:30 AM for evaluation please have him to be scheduled. If having soreness he can take ibuprofen or Tylenol.   Thank you

## 2023-08-21 ENCOUNTER — OFFICE VISIT (OUTPATIENT)
Dept: FAMILY MEDICINE CLINIC | Facility: CLINIC | Age: 58
End: 2023-08-21
Payer: COMMERCIAL

## 2023-08-21 ENCOUNTER — TELEPHONE (OUTPATIENT)
Dept: FAMILY MEDICINE CLINIC | Facility: CLINIC | Age: 58
End: 2023-08-21

## 2023-08-21 VITALS
HEART RATE: 66 BPM | TEMPERATURE: 98 F | HEIGHT: 69 IN | SYSTOLIC BLOOD PRESSURE: 134 MMHG | DIASTOLIC BLOOD PRESSURE: 86 MMHG | BODY MASS INDEX: 27.85 KG/M2 | WEIGHT: 188 LBS | RESPIRATION RATE: 16 BRPM

## 2023-08-21 DIAGNOSIS — S20.02XA CONTUSION OF LEFT BREAST, INITIAL ENCOUNTER: Primary | ICD-10-CM

## 2023-08-21 PROCEDURE — 3008F BODY MASS INDEX DOCD: CPT | Performed by: FAMILY MEDICINE

## 2023-08-21 PROCEDURE — 3075F SYST BP GE 130 - 139MM HG: CPT | Performed by: FAMILY MEDICINE

## 2023-08-21 PROCEDURE — 3079F DIAST BP 80-89 MM HG: CPT | Performed by: FAMILY MEDICINE

## 2023-08-21 PROCEDURE — 99213 OFFICE O/P EST LOW 20 MIN: CPT | Performed by: FAMILY MEDICINE

## 2023-08-21 NOTE — PATIENT INSTRUCTIONS
Call [phone] to schedule US breast.   Stop aspirin. Use Aquaphor topically. See orthopedic doctor for your trigger thumb.

## 2023-08-21 NOTE — TELEPHONE ENCOUNTER
Order was signed.   Please let also the patient know so he is aware for the radiology requested also mammogram.  Thank you

## 2023-08-21 NOTE — TELEPHONE ENCOUNTER
Received call from [facility]  [phone]    Pt was given order for US breast bilateral.  Paul Melendez states we will also need to enter order for bilateral diagnostic mammogram    Order pended    Ollie Nava would like call back from Triage once order is placed

## 2023-08-21 NOTE — TELEPHONE ENCOUNTER
Call to [facility]. Reaches . LVM informing that order for diagnostic mammogram has been placed for this patient. Call to [facility]. Informed him of order for mammogram per radiology request.  Pt verbalized understanding and is agreeable to plan. No further questions at this time.

## 2023-08-29 ENCOUNTER — HOSPITAL ENCOUNTER (OUTPATIENT)
Dept: MAMMOGRAPHY | Facility: HOSPITAL | Age: 58
Discharge: HOME | End: 2023-08-29
Attending: FAMILY MEDICINE
Payer: COMMERCIAL

## 2023-08-29 DIAGNOSIS — S20.02XA CONTUSION OF LEFT BREAST, INITIAL ENCOUNTER: ICD-10-CM

## 2023-08-29 PROCEDURE — 76642 ULTRASOUND BREAST LIMITED: CPT | Performed by: FAMILY MEDICINE

## 2023-08-29 PROCEDURE — 77062 BREAST TOMOSYNTHESIS BI: CPT | Performed by: FAMILY MEDICINE

## 2023-08-29 PROCEDURE — 77066 DX MAMMO INCL CAD BI: CPT | Performed by: FAMILY MEDICINE

## 2023-08-31 DIAGNOSIS — S20.02XD CONTUSION OF LEFT BREAST, SUBSEQUENT ENCOUNTER: Primary | ICD-10-CM

## 2023-09-25 ENCOUNTER — OFFICE VISIT (OUTPATIENT)
Dept: ORTHOPEDICS CLINIC | Facility: CLINIC | Age: 58
End: 2023-09-25
Payer: COMMERCIAL

## 2023-09-25 ENCOUNTER — HOSPITAL ENCOUNTER (OUTPATIENT)
Dept: GENERAL RADIOLOGY | Age: 58
Discharge: HOME | End: 2023-09-25
Attending: ORTHOPAEDIC SURGERY
Payer: COMMERCIAL

## 2023-09-25 VITALS — BODY MASS INDEX: 27.85 KG/M2 | WEIGHT: 188 LBS | HEIGHT: 69 IN

## 2023-09-25 DIAGNOSIS — M79.644 THUMB PAIN, RIGHT: ICD-10-CM

## 2023-09-25 DIAGNOSIS — M65.311 TRIGGER FINGER OF RIGHT THUMB: Primary | ICD-10-CM

## 2023-09-25 PROCEDURE — 73140 X-RAY EXAM OF FINGER(S): CPT | Performed by: ORTHOPAEDIC SURGERY

## 2023-09-25 RX ORDER — BETAMETHASONE SODIUM PHOSPHATE AND BETAMETHASONE ACETATE 3; 3 MG/ML; MG/ML
6 INJECTION, SUSPENSION INTRA-ARTICULAR; INTRALESIONAL; INTRAMUSCULAR; SOFT TISSUE ONCE
Status: COMPLETED | OUTPATIENT
Start: 2023-09-25 | End: 2023-09-25

## 2023-09-25 RX ADMIN — BETAMETHASONE SODIUM PHOSPHATE AND BETAMETHASONE ACETATE 6 MG: 3; 3 INJECTION, SUSPENSION INTRA-ARTICULAR; INTRALESIONAL; INTRAMUSCULAR; SOFT TISSUE at 11:31:00

## 2023-10-19 ENCOUNTER — TELEPHONE (OUTPATIENT)
Dept: FAMILY MEDICINE CLINIC | Facility: CLINIC | Age: 58
End: 2023-10-19

## 2023-10-20 ENCOUNTER — LAB ENCOUNTER (OUTPATIENT)
Dept: LAB | Age: 58
End: 2023-10-20
Attending: FAMILY MEDICINE

## 2023-10-20 DIAGNOSIS — E78.00 PURE HYPERCHOLESTEROLEMIA: ICD-10-CM

## 2023-10-20 LAB
ALBUMIN SERPL-MCNC: 3.8 G/DL (ref 3.4–5)
ALBUMIN/GLOB SERPL: 1.1 {RATIO} (ref 1–2)
ALP LIVER SERPL-CCNC: 61 U/L
ALT SERPL-CCNC: 38 U/L
ANION GAP SERPL CALC-SCNC: 7 MMOL/L (ref 0–18)
AST SERPL-CCNC: 25 U/L (ref 15–37)
BILIRUB SERPL-MCNC: 0.6 MG/DL (ref 0.1–2)
BUN BLD-MCNC: 15 MG/DL (ref 7–18)
CALCIUM BLD-MCNC: 9.3 MG/DL (ref 8.5–10.1)
CHLORIDE SERPL-SCNC: 108 MMOL/L (ref 98–112)
CHOLEST SERPL-MCNC: 172 MG/DL (ref ?–200)
CO2 SERPL-SCNC: 24 MMOL/L (ref 21–32)
CREAT BLD-MCNC: 1.16 MG/DL
EGFRCR SERPLBLD CKD-EPI 2021: 72 ML/MIN/1.73M2 (ref 60–?)
FASTING PATIENT LIPID ANSWER: YES
FASTING STATUS PATIENT QL REPORTED: YES
GLOBULIN PLAS-MCNC: 3.6 G/DL (ref 2.8–4.4)
GLUCOSE BLD-MCNC: 95 MG/DL (ref 70–99)
HDLC SERPL-MCNC: 70 MG/DL (ref 40–59)
LDLC SERPL CALC-MCNC: 90 MG/DL (ref ?–100)
NONHDLC SERPL-MCNC: 102 MG/DL (ref ?–130)
OSMOLALITY SERPL CALC.SUM OF ELEC: 289 MOSM/KG (ref 275–295)
POTASSIUM SERPL-SCNC: 4.1 MMOL/L (ref 3.5–5.1)
PROT SERPL-MCNC: 7.4 G/DL (ref 6.4–8.2)
SODIUM SERPL-SCNC: 139 MMOL/L (ref 136–145)
TRIGL SERPL-MCNC: 61 MG/DL (ref 30–149)
VLDLC SERPL CALC-MCNC: 10 MG/DL (ref 0–30)

## 2023-10-20 PROCEDURE — 80053 COMPREHEN METABOLIC PANEL: CPT

## 2023-10-20 PROCEDURE — 80061 LIPID PANEL: CPT

## 2023-11-18 NOTE — TELEPHONE ENCOUNTER
Admitted to the unit at start of shift from [address] Med Surg per wheelchair with transport staff with L AKA and prevena wound vac in place. Alert and oriented x4 can be forgetful. Denies pain and SOB upon arrival and throughout the whole shift. Scheduled Tylenol given. Sternal and abdominal precaution maintained. Chest/abdominal incision dressing changed tonight, incision approximated and without drainage. Prevena wound vac without issues. Ate 50% of food ordered at dinner independently. Continent of bladder, used the urinal. PVR x1 =191 ml. Able to make his needs known, used his call light appropriately and waited for assistance. Bed alarms on.   [place] calling to ask for refill requested on 10/12 for ATORVASTATIN.

## 2023-11-21 ENCOUNTER — TELEPHONE (OUTPATIENT)
Dept: ORTHOPEDICS CLINIC | Facility: CLINIC | Age: 58
End: 2023-11-21

## 2023-11-21 NOTE — TELEPHONE ENCOUNTER
Patient called wanting to schedule a follow up appt. But did not want to wait for first available. Patient would like to know if Dr. Santos Valles can give him a call so he can talk to him about his thumb.  Please advise

## 2023-12-04 ENCOUNTER — OFFICE VISIT (OUTPATIENT)
Dept: ORTHOPEDICS CLINIC | Facility: CLINIC | Age: 58
End: 2023-12-04
Payer: COMMERCIAL

## 2023-12-04 VITALS — BODY MASS INDEX: 27.85 KG/M2 | HEIGHT: 69 IN | WEIGHT: 188 LBS

## 2023-12-04 DIAGNOSIS — M65.311 TRIGGER FINGER OF RIGHT THUMB: Primary | ICD-10-CM

## 2023-12-04 RX ORDER — BETAMETHASONE SODIUM PHOSPHATE AND BETAMETHASONE ACETATE 3; 3 MG/ML; MG/ML
6 INJECTION, SUSPENSION INTRA-ARTICULAR; INTRALESIONAL; INTRAMUSCULAR; SOFT TISSUE ONCE
Status: COMPLETED | OUTPATIENT
Start: 2023-12-04 | End: 2023-12-04

## 2023-12-04 RX ADMIN — BETAMETHASONE SODIUM PHOSPHATE AND BETAMETHASONE ACETATE 6 MG: 3; 3 INJECTION, SUSPENSION INTRA-ARTICULAR; INTRALESIONAL; INTRAMUSCULAR; SOFT TISSUE at 07:56:00

## 2023-12-04 NOTE — PROGRESS NOTES
Clinic Note [facility]     Assessment/Plan:  59year old with triggering of right thumb s/p 1 CSI with mild reccurence. I discussed with the patient various treatment options and their success rate/risks. We using a shared decision-making process decided to proceed with a second corticosteroid injection today. Patient is concerned about psoriatic arthritis given history of psoriasis and recent onset of joint inflammation and pain, and we will refer him to rheumatology for evaluation and further work up. Patient will follow up in 6 weeks. If patient symptoms are completely resolved, patient can cancel the appointment and follow-up on an as-needed basis. If symptoms persist, we may discuss further surgical treatment options. Follow up: 6-8 weeks    Procedure:  Written consent was obtained. Skin was prepped with ChloraPrep. 1 mL of 6 mg betamethasone and 1 mL of 1% lidocaine was injected into the subcutaneous tissue overlying the A1 pulley. Patient tolerated the procedure. No complications were encountered. Band-Aid was applied. Diagnostics:  X-ray right hand 3 views: Mild degenerative changes of the thumb CMC joint. No fractures dislocations or osseous abnormalities. Physical Exam:    There were no vitals taken for this visit. Constitutional: NAD. AOx3. Well-developed and Well-nourished. Psychiatric: Normal mood/ affect/ behavior. Judgment and thought content normal.     Right upper Extremity:   Inspection: Skin Intact. No skin lesions. No gross deformity. Palpation:  (+) TTP over A1 pulley   Motion: Elbow: normal bilateral symmetric ext/flex  Wrist: normal bilateral symmetric ext/flex/sup/pro  Finger: full composite fist   Special Tests: (-) Active triggering. (+) IPJ contracture. Normally sensate digit. Normally perfused digit. CC: Triggering of right thumb    HPI: 59year old right-hand-dominant male presents with triggering of right thumb. It started 7 weeks ago.  It has failed to improve/resolve. Pain level is moderate to severe. Pain is described as locking, throbbing, aching, sharp, shooting, constant. Patient has tried brace, NSAIDs, and range of motion exercises. (+) pain at A1 Pulley  (+) active triggering    Interval history (12/4/2023): Patient notes mild improvement in symptoms since first corticosteroid injection. Reports that a couple weeks ago, he felt moderate pain and followed by a loss of strength in his left thumb. Reports pain while grasping, and states that he has also been experiencing notable swelling at his joints. Past Medical History:   Diagnosis Date    Glossitis     Glossodynia     High cholesterol     Hypercholesterolemia     Orchitis and epididymitis, unspecified     Psoriasis      Past Surgical History:   Procedure Laterality Date    COLONOSCOPY  2009    normal    FEMUR/KNEE SURG UNLISTED      arthroscopy knee left    KNEE ARTHROSCOPY      KNEE SCOPE,MED/LAT MENISECTOMY Left 1/14/2015    Procedure: ARTHROSCOPY LEFT KNEE AND DEBRIDEMENT W/ MEDIAL MENISCECTOMY;  Surgeon: Trung Samaniego MD;  Location: [facility]    OTHER SURGICAL HISTORY      injection of neurolytic substance epidural    OTHER SURGICAL HISTORY  2013    right knee scope     Current Outpatient Medications   Medication Sig Dispense Refill    traMADol 50 MG Oral Tab Take 1 tablet (50 mg total) by mouth every 6 (six) hours as needed for Pain. (Patient not taking: Reported on 8/8/2023) 15 tablet 0    tadalafil 5 MG Oral Tab TAKE ONE TABLET BY MOUTH DAILY AS NEEDED. NO ADDITIONAL REFILLS UNTIL AFTER FOLLOW UP VISIT 30 tablet 11    atorvastatin 20 MG Oral Tab Take 2 tablets by mouth nightly on Monday, Wednesday, Friday and 1 tablet nightly all other days 120 tablet 1    cimetidine 400 MG Oral Tab Take 1 tablet (400 mg total) by mouth 2 (two) times daily. (Patient not taking: Reported on 8/8/2023)      VTAMA 1 % External Cream Apply to affected area throughout body once daily.       fluticasone propionate 50 MCG/ACT Nasal Suspension 2 sprays by Nasal route daily. 16 g 3    aspirin 81 MG Oral Tab EC Take 1 tablet (81 mg total) by mouth daily. (Patient not taking: Reported on 8/8/2023)      ibuprofen 200 MG Oral Tab Take 2 tablets (400 mg total) by mouth every 6 (six) hours as needed for Pain. Multiple Vitamins-Minerals (MULTIVITAMIN ADULTS 50+) Oral Tab Take 1 tablet by mouth daily. Allergies   Allergen Reactions    Mold     Pollen      Family History   Problem Relation Age of Onset    Cancer Father 77        Prostate    Other (Other) Maternal Grandfather         liver cirrhosis    Other (Other) Paternal Grandfather         liver cirrhosis    Other (Other) Mother         hearing loss    Other (aaa) Mother         AAA    Heart Disorder Maternal Aunt         MI     Social History     Occupational History    Not on file   Tobacco Use    Smoking status: Former     Types: Cigars    Smokeless tobacco: Never   Vaping Use    Vaping Use: Never used   Substance and Sexual Activity    Alcohol use: Yes     Alcohol/week: 0.0 standard drinks of alcohol     Comment: 3 times a week    Drug use: No    Sexual activity: Yes     Partners: Female        Review of Systems (negative unless bolded):  General: fevers, chills, fatigue  CV:  chest pain, palpitations, leg swelling  Msk: bodyaches, neck pain, neck stiffness  Skin: rashes, open wounds, nonhealing ulcers  Hem: bleeds easily, bruise easily, immunocompromised  Neuro: dizziness, light headedness, headaches  Psych: anxious, depressed, anger issues    Attention: This note has been scribed by Chely Pearson under the supervision of Lorie Lassiter MD.         Lorie Lassiter MD  Hand, Wrist, & Elbow Surgery  [facility]  [facility] [address], [address] [facility], [address] [place] [address]  [email or web address]  t: [id number]  f: [phone]

## 2023-12-09 DIAGNOSIS — E78.00 PURE HYPERCHOLESTEROLEMIA: ICD-10-CM

## 2023-12-09 RX ORDER — ATORVASTATIN CALCIUM 20 MG/1
TABLET, FILM COATED ORAL
Qty: 60 TABLET | Refills: 0 | Status: SHIPPED | OUTPATIENT
Start: 2023-12-09

## 2023-12-09 NOTE — TELEPHONE ENCOUNTER
LOV:  05/24/2023 for: CPX  Patient advised to RTC on:   6 months follow-up for med check.      Medication Quantity Refills Start End   atorvastatin 20 MG Oral Tab 120 tablet 1 7/3/2023    Sig:   Take 2 tablets by mouth nightly on Monday, Wednesday, Friday and 1 tablet nightly all other days

## 2023-12-22 DIAGNOSIS — E78.00 PURE HYPERCHOLESTEROLEMIA: ICD-10-CM

## 2023-12-22 NOTE — TELEPHONE ENCOUNTER
Requested Prescriptions     Pending Prescriptions Disp Refills    ATORVASTATIN 20 MG Oral Tab [Pharmacy Med Name: Atorvastatin Calcium 20 Mg Tab Nort] 120 tablet 0     Sig: Take 2 tablets by mouth nightly on Monday, Wednesday, Friday and 1 tablet nightly all other days     Last refill 12/9/23 #60  Patient is due for office visit - routing to front office to assist with scheduling  No future appointments.

## 2023-12-26 RX ORDER — ATORVASTATIN CALCIUM 20 MG/1
TABLET, FILM COATED ORAL
Qty: 120 TABLET | Refills: 0 | Status: SHIPPED | OUTPATIENT
Start: 2023-12-26

## 2024-04-10 DIAGNOSIS — E78.00 PURE HYPERCHOLESTEROLEMIA: ICD-10-CM

## 2024-04-10 RX ORDER — ATORVASTATIN CALCIUM 20 MG/1
TABLET, FILM COATED ORAL
Qty: 120 TABLET | Refills: 0 | Status: SHIPPED | OUTPATIENT
Start: 2024-04-10

## 2024-04-10 NOTE — TELEPHONE ENCOUNTER
LOV:  5/24/2023 for: CPX  Patient advised to RTC on:  6 months follow-up for med check.   Nov:06/26/2024      Medication Quantity Refills Start End   atorvastatin 20 MG Oral Tab 120 tablet 0 12/26/2023 --   Sig:   Take 2 tablets by mouth nightly on Monday, Wednesday, Friday and 1 tablet nightly all other days

## 2024-06-10 ENCOUNTER — PATIENT MESSAGE (OUTPATIENT)
Dept: FAMILY MEDICINE CLINIC | Facility: CLINIC | Age: 59
End: 2024-06-10

## 2024-06-10 DIAGNOSIS — Z00.00 ROUTINE GENERAL MEDICAL EXAMINATION AT A HEALTH CARE FACILITY: Primary | ICD-10-CM

## 2024-06-10 DIAGNOSIS — Z12.5 SCREENING FOR PROSTATE CANCER: ICD-10-CM

## 2024-06-10 DIAGNOSIS — Z00.00 LABORATORY EXAMINATION ORDERED AS PART OF A ROUTINE GENERAL MEDICAL EXAMINATION: ICD-10-CM

## 2024-06-10 NOTE — TELEPHONE ENCOUNTER
From: Chato Royal  To: Amna Owens  Sent: 6/10/2024 2:27 PM CDT  Subject: in prep for Lele annual Physical    What do you need from me prior to June 26th 8AM visit?  Chato, [phone]

## 2024-06-14 ENCOUNTER — LAB ENCOUNTER (OUTPATIENT)
Dept: LAB | Age: 59
End: 2024-06-14
Attending: FAMILY MEDICINE

## 2024-06-14 DIAGNOSIS — Z00.00 ROUTINE GENERAL MEDICAL EXAMINATION AT A HEALTH CARE FACILITY: ICD-10-CM

## 2024-06-14 DIAGNOSIS — Z12.5 SCREENING FOR PROSTATE CANCER: ICD-10-CM

## 2024-06-14 DIAGNOSIS — Z00.00 LABORATORY EXAMINATION ORDERED AS PART OF A ROUTINE GENERAL MEDICAL EXAMINATION: ICD-10-CM

## 2024-06-14 LAB
ALBUMIN SERPL-MCNC: 4.3 G/DL (ref 3.4–5)
ALBUMIN/GLOB SERPL: 1.2 {RATIO} (ref 1–2)
ALP LIVER SERPL-CCNC: 65 U/L
ALT SERPL-CCNC: 30 U/L
ANION GAP SERPL CALC-SCNC: 8 MMOL/L (ref 0–18)
AST SERPL-CCNC: 17 U/L (ref 15–37)
BASOPHILS # BLD AUTO: 0.09 X10(3) UL (ref 0–0.2)
BASOPHILS NFR BLD AUTO: 1.4 %
BILIRUB SERPL-MCNC: 0.4 MG/DL (ref 0.1–2)
BILIRUB UR QL STRIP.AUTO: NEGATIVE
BUN BLD-MCNC: 17 MG/DL (ref 9–23)
CALCIUM BLD-MCNC: 9.2 MG/DL (ref 8.5–10.1)
CHLORIDE SERPL-SCNC: 110 MMOL/L (ref 98–112)
CHOLEST SERPL-MCNC: 173 MG/DL (ref ?–200)
CLARITY UR REFRACT.AUTO: CLEAR
CO2 SERPL-SCNC: 22 MMOL/L (ref 21–32)
COMPLEXED PSA SERPL-MCNC: 2.53 NG/ML (ref ?–4)
CREAT BLD-MCNC: 1.18 MG/DL
EGFRCR SERPLBLD CKD-EPI 2021: 70 ML/MIN/1.73M2 (ref 60–?)
EOSINOPHIL # BLD AUTO: 0.31 X10(3) UL (ref 0–0.7)
EOSINOPHIL NFR BLD AUTO: 4.8 %
ERYTHROCYTE [DISTWIDTH] IN BLOOD BY AUTOMATED COUNT: 12.3 %
FASTING PATIENT LIPID ANSWER: YES
FASTING STATUS PATIENT QL REPORTED: YES
GLOBULIN PLAS-MCNC: 3.6 G/DL (ref 2.8–4.4)
GLUCOSE BLD-MCNC: 88 MG/DL (ref 70–99)
GLUCOSE UR STRIP.AUTO-MCNC: NORMAL MG/DL
HCT VFR BLD AUTO: 45 %
HDLC SERPL-MCNC: 58 MG/DL (ref 40–59)
HGB BLD-MCNC: 14.8 G/DL
IMM GRANULOCYTES # BLD AUTO: 0.05 X10(3) UL (ref 0–1)
IMM GRANULOCYTES NFR BLD: 0.8 %
KETONES UR STRIP.AUTO-MCNC: NEGATIVE MG/DL
LDLC SERPL CALC-MCNC: 77 MG/DL (ref ?–100)
LEUKOCYTE ESTERASE UR QL STRIP.AUTO: NEGATIVE
LYMPHOCYTES # BLD AUTO: 1.76 X10(3) UL (ref 1–4)
LYMPHOCYTES NFR BLD AUTO: 27.5 %
MCH RBC QN AUTO: 30.5 PG (ref 26–34)
MCHC RBC AUTO-ENTMCNC: 32.9 G/DL (ref 31–37)
MCV RBC AUTO: 92.8 FL
MONOCYTES # BLD AUTO: 0.57 X10(3) UL (ref 0.1–1)
MONOCYTES NFR BLD AUTO: 8.9 %
NEUTROPHILS # BLD AUTO: 3.63 X10 (3) UL (ref 1.5–7.7)
NEUTROPHILS # BLD AUTO: 3.63 X10(3) UL (ref 1.5–7.7)
NEUTROPHILS NFR BLD AUTO: 56.6 %
NITRITE UR QL STRIP.AUTO: NEGATIVE
NONHDLC SERPL-MCNC: 115 MG/DL (ref ?–130)
OSMOLALITY SERPL CALC.SUM OF ELEC: 291 MOSM/KG (ref 275–295)
PH UR STRIP.AUTO: 5 [PH] (ref 5–8)
PLATELET # BLD AUTO: 175 10(3)UL (ref 150–450)
POTASSIUM SERPL-SCNC: 4 MMOL/L (ref 3.5–5.1)
PROT SERPL-MCNC: 7.9 G/DL (ref 6.4–8.2)
PROT UR STRIP.AUTO-MCNC: NEGATIVE MG/DL
RBC # BLD AUTO: 4.85 X10(6)UL
RBC UR QL AUTO: NEGATIVE
SODIUM SERPL-SCNC: 140 MMOL/L (ref 136–145)
SP GR UR STRIP.AUTO: 1.02 (ref 1–1.03)
TRIGL SERPL-MCNC: 231 MG/DL (ref 30–149)
TSI SER-ACNC: 0.8 MIU/ML (ref 0.36–3.74)
UROBILINOGEN UR STRIP.AUTO-MCNC: NORMAL MG/DL
VLDLC SERPL CALC-MCNC: 36 MG/DL (ref 0–30)
WBC # BLD AUTO: 6.4 X10(3) UL (ref 4–11)

## 2024-06-14 PROCEDURE — 84443 ASSAY THYROID STIM HORMONE: CPT

## 2024-06-14 PROCEDURE — 80061 LIPID PANEL: CPT

## 2024-06-14 PROCEDURE — 80053 COMPREHEN METABOLIC PANEL: CPT

## 2024-06-14 PROCEDURE — 85025 COMPLETE CBC W/AUTO DIFF WBC: CPT

## 2024-06-14 PROCEDURE — 81003 URINALYSIS AUTO W/O SCOPE: CPT

## 2024-06-26 ENCOUNTER — OFFICE VISIT (OUTPATIENT)
Dept: FAMILY MEDICINE CLINIC | Facility: CLINIC | Age: 59
End: 2024-06-26

## 2024-06-26 ENCOUNTER — PATIENT MESSAGE (OUTPATIENT)
Dept: FAMILY MEDICINE CLINIC | Facility: CLINIC | Age: 59
End: 2024-06-26

## 2024-06-26 VITALS
HEART RATE: 60 BPM | HEIGHT: 69 IN | DIASTOLIC BLOOD PRESSURE: 80 MMHG | RESPIRATION RATE: 18 BRPM | BODY MASS INDEX: 27.4 KG/M2 | WEIGHT: 185 LBS | TEMPERATURE: 98 F | SYSTOLIC BLOOD PRESSURE: 120 MMHG

## 2024-06-26 DIAGNOSIS — L82.1 SEBORRHEIC KERATOSES: ICD-10-CM

## 2024-06-26 DIAGNOSIS — Z00.00 PHYSICAL EXAM, ANNUAL: Primary | ICD-10-CM

## 2024-06-26 DIAGNOSIS — E78.00 PURE HYPERCHOLESTEROLEMIA: ICD-10-CM

## 2024-06-26 DIAGNOSIS — R12 HEARTBURN: ICD-10-CM

## 2024-06-26 DIAGNOSIS — J40 BRONCHITIS: ICD-10-CM

## 2024-06-26 PROCEDURE — 99213 OFFICE O/P EST LOW 20 MIN: CPT | Performed by: FAMILY MEDICINE

## 2024-06-26 PROCEDURE — 99396 PREV VISIT EST AGE 40-64: CPT | Performed by: FAMILY MEDICINE

## 2024-06-26 RX ORDER — FLUTICASONE PROPIONATE AND SALMETEROL 250; 50 UG/1; UG/1
1 POWDER RESPIRATORY (INHALATION) EVERY 12 HOURS SCHEDULED
Qty: 1 EACH | Refills: 0 | Status: SHIPPED | OUTPATIENT
Start: 2024-06-26 | End: 2024-06-26

## 2024-06-26 RX ORDER — FLUTICASONE PROPIONATE AND SALMETEROL 50; 250 UG/1; UG/1
1 POWDER RESPIRATORY (INHALATION) EVERY 12 HOURS SCHEDULED
Qty: 60 EACH | Refills: 0 | Status: SHIPPED | OUTPATIENT
Start: 2024-06-26

## 2024-06-26 NOTE — PROGRESS NOTES
Chato Royal is a 59 year old male who presents for a complete physical exam.   HPI:   Pt has psoriasis.  Patient seeing dermatologist Dr. Bryan.  To try different medication which is working much better for him.  Has couple of lesions 1 on the face 1 on the abdominal wall which would like to check today.  Cholesterol blood work shows elevated triglycerides he likes cheese and butter he will try to modify diet.  Complains of having heartburn maybe 4 times per month.  Using Tagamet as needed.  Will refer patient to GI doctor for evaluation.  Had episodes of cough 2 months ago.  Also 2 weeks ago had episode with coughing up something.  Has rhonchi on examination today we will try inhaler.  History of allergies and sinus problems with right now doing well does not use Flonase regularly.  No shortness of breath.  No wheezing.    Wt Readings from Last 6 Encounters:   06/26/24 185 lb (83.9 kg)   12/04/23 188 lb (85.3 kg)   09/25/23 188 lb (85.3 kg)   08/21/23 188 lb (85.3 kg)   08/08/23 185 lb 12.8 oz (84.3 kg)   08/05/23 182 lb (82.6 kg)     Body mass index is 27.32 kg/m².     Cholesterol, Total (mg/dL)   Date Value   06/14/2024 173   10/20/2023 172   04/18/2023 187     CHOLESTEROL, TOTAL (mg/dL)   Date Value   08/03/2011 197   01/12/2011 257 (H)     CHOLESTEROL (mg/dL)   Date Value   02/21/2014 221 (H)   11/09/2012 216 (H)     HDL Cholesterol (mg/dL)   Date Value   06/14/2024 58   10/20/2023 70 (H)   04/18/2023 60 (H)     HDL CHOLESTEROL (mg/dL)   Date Value   08/03/2011 52   01/12/2011 54     HDL CHOL (mg/dL)   Date Value   02/21/2014 46   11/09/2012 61     LDL Cholesterol (mg/dL)   Date Value   06/14/2024 77   10/20/2023 90   04/18/2023 97     LDL-CHOLESTEROL (mg/dL (calc))   Date Value   08/03/2011 106   01/12/2011 164 (H)     LDL CHOLESTROL (mg/dL)   Date Value   02/21/2014 158 (H)   11/09/2012 136 (H)     AST (U/L)   Date Value   06/14/2024 17   10/20/2023 25   04/18/2023 26   02/21/2014 25   11/09/2012 22    08/03/2011 29   01/12/2011 27     ALT (U/L)   Date Value   06/14/2024 30   10/20/2023 38   04/18/2023 41   02/21/2014 44   11/09/2012 41   08/03/2011 30   01/12/2011 32      PSA (ng/mL)   Date Value   11/29/2017 1.980   10/05/2016 1.790   10/09/2015 1.330   02/21/2014 1.040   11/09/2012 1.11     No results found for: \"GLUCOSE\"    Current Outpatient Medications   Medication Sig Dispense Refill    ADVAIR DISKUS 250-50 MCG/ACT Inhalation Aerosol Powder, Breath Activated Inhale 1 puff into the lungs every 12 (twelve) hours. 60 each 0    atorvastatin 20 MG Oral Tab TAKE 2 TABLETS BY MOUTH ON MONDAY, WEDNESDAY, AND FRIDAY NIGHTS AND TAKE 1 TABLET ON ALL OTHER DAYS 120 tablet 0    tadalafil 5 MG Oral Tab TAKE ONE TABLET BY MOUTH DAILY AS NEEDED. NO ADDITIONAL REFILLS UNTIL AFTER FOLLOW UP VISIT 30 tablet 11    VTAMA 1 % External Cream Apply to affected area throughout body once daily.      fluticasone propionate 50 MCG/ACT Nasal Suspension 2 sprays by Nasal route daily. 16 g 3    aspirin 81 MG Oral Tab EC Take 1 tablet (81 mg total) by mouth daily.      ibuprofen 200 MG Oral Tab Take 2 tablets (400 mg total) by mouth every 6 (six) hours as needed for Pain.      Multiple Vitamins-Minerals (MULTIVITAMIN ADULTS 50+) Oral Tab Take 1 tablet by mouth daily.      cimetidine 400 MG Oral Tab Take 1 tablet (400 mg total) by mouth 2 (two) times daily.        Past Medical History:    Glossitis    Glossodynia    High cholesterol    Hypercholesterolemia    Orchitis and epididymitis, unspecified    Psoriasis      Past Surgical History:   Procedure Laterality Date    Colonoscopy  2009    normal    Femur/knee surg unlisted      arthroscopy knee left    Knee arthroscopy      Knee scope,med/lat menisectomy Left 1/14/2015    Procedure: ARTHROSCOPY LEFT KNEE AND DEBRIDEMENT W/ MEDIAL MENISCECTOMY;  Surgeon: Octavio Seaman MD;  Location: [facility]    Other surgical history      injection of neurolytic substance epidural    Other  surgical history  2013    right knee scope      Family History   Problem Relation Age of Onset    Cancer Father 66        Prostate    Other (Other) Mother         hearing loss    Other (aaa) Mother         AAA    Other (Other) Maternal Grandfather         liver cirrhosis    Other (Other) Paternal Grandfather         liver cirrhosis    Cancer Brother         skin ca    Heart Disorder Maternal Aunt         MI      Social History:  Social History     Socioeconomic History    Marital status:    Tobacco Use    Smoking status: Former     Types: Cigars    Smokeless tobacco: Never   Vaping Use    Vaping status: Never Used   Substance and Sexual Activity    Alcohol use: Yes     Alcohol/week: 0.0 standard drinks of alcohol     Comment: 3 times a week    Drug use: No    Sexual activity: Yes     Partners: Female   Other Topics Concern    Caffeine Concern No     Comment: 24 oz    Exercise Yes     Comment: regular     Seat Belt Yes      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   Exercise: three times per week.  Diet: watches calories closely     REVIEW OF SYSTEMS:   GENERAL: feels well otherwise  SKIN: denies any unusual skin lesions  EYES:denies blurred vision or double vision  HEENT: denies nasal congestion, sinus pain or ST  LUNGS: denies shortness of breath with exertion, had episodes of cough  CARDIOVASCULAR: denies chest pain on exertion  GI: denies abdominal pain,denies heartburn  : denies nocturia or changes in stream  MUSCULOSKELETAL: denies back pain  NEURO: denies headaches  PSYCHE: denies depression or anxiety  HEMATOLOGIC: denies hx of anemia  ENDOCRINE: denies thyroid history  ALL/ASTHMA: denies hx of allergy or asthma    EXAM:   /80 (BP Location: Left arm, Patient Position: Sitting, Cuff Size: adult)   Pulse 60   Temp 97.7 °F (36.5 °C) (Temporal)   Resp 18   Ht 5' 9\" (1.753 m)   Wt 185 lb (83.9 kg)   BMI 27.32 kg/m²   Body mass index is 27.32 kg/m².   GENERAL: well developed, well  nourished,in no apparent distress  SKIN: no rashes,no suspicious lesions, seborrheic keratosis right upper cheek  Seborrheic keratosis on the back  HEENT: atraumatic, normocephalic,ears and throat are clear  EYES:PERRLA, EOMI, conjunctiva are clear  NECK: supple,no adenopathy,  CHEST: no chest tenderness  BREAST: no dominant or suspicious mass  LUNGS: clear to auscultation, there are some rhonchi's with cough  CARDIO: RRR without murmur  GI: good BS's,no masses, HSM or tenderness  : No testicular masses,  RECTAL: Normal rectal tone, prostate -no masses.   MUSCULOSKELETAL: back is not tender,FROM of the back  EXTREMITIES: no cyanosis, clubbing or edema  NEURO: Oriented times three,cranial nerves are intact,motor and sensory are grossly intact    Results for orders placed or performed in visit on 06/14/24   TSH W Reflex To Free T4   Result Value Ref Range    TSH 0.804 0.358 - 3.740 mIU/mL   Comp Metabolic Panel (14)   Result Value Ref Range    Glucose 88 70 - 99 mg/dL    Sodium 140 136 - 145 mmol/L    Potassium 4.0 3.5 - 5.1 mmol/L    Chloride 110 98 - 112 mmol/L    CO2 22.0 21.0 - 32.0 mmol/L    Anion Gap 8 0 - 18 mmol/L    BUN 17 9 - 23 mg/dL    Creatinine 1.18 0.70 - 1.30 mg/dL    Calcium, Total 9.2 8.5 - 10.1 mg/dL    Calculated Osmolality 291 275 - 295 mOsm/kg    eGFR-Cr 70 >=60 mL/min/1.73m2    AST 17 15 - 37 U/L    ALT 30 16 - 61 U/L    Alkaline Phosphatase 65 45 - 117 U/L    Bilirubin, Total 0.4 0.1 - 2.0 mg/dL    Total Protein 7.9 6.4 - 8.2 g/dL    Albumin 4.3 3.4 - 5.0 g/dL    Globulin  3.6 2.8 - 4.4 g/dL    A/G Ratio 1.2 1.0 - 2.0    Patient Fasting for CMP? Yes    Lipid Panel   Result Value Ref Range    Cholesterol, Total 173 <200 mg/dL    HDL Cholesterol 58 40 - 59 mg/dL    Triglycerides 231 (H) 30 - 149 mg/dL    LDL Cholesterol 77 <100 mg/dL    VLDL 36 (H) 0 - 30 mg/dL    Non HDL Chol 115 <130 mg/dL    Patient Fasting for Lipid? Yes    PSA Total, Screen   Result Value Ref Range    Prostate Specific  Antigen Screen 2.53 <=4.00 ng/mL   Urinalysis with Culture Reflex    Specimen: Urine, clean catch   Result Value Ref Range    Urine Color Light-Yellow Yellow    Clarity Urine Clear Clear    Spec Gravity 1.020 1.005 - 1.030    Glucose Urine Normal Normal mg/dL    Bilirubin Urine Negative Negative    Ketones Urine Negative Negative mg/dL    Blood Urine Negative Negative    pH Urine 5.0 5.0 - 8.0    Protein Urine Negative Negative mg/dL    Urobilinogen Urine Normal Normal mg/dL    Nitrite Urine Negative Negative    Leukocyte Esterase Urine Negative Negative    Microscopic Microscopic not indicated    CBC W/ DIFFERENTIAL   Result Value Ref Range    WBC 6.4 4.0 - 11.0 x10(3) uL    RBC 4.85 4.30 - 5.70 x10(6)uL    HGB 14.8 13.0 - 17.5 g/dL    HCT 45.0 39.0 - 53.0 %    .0 150.0 - 450.0 10(3)uL    MCV 92.8 80.0 - 100.0 fL    MCH 30.5 26.0 - 34.0 pg    MCHC 32.9 31.0 - 37.0 g/dL    RDW 12.3 %    Neutrophil Absolute Prelim 3.63 1.50 - 7.70 x10 (3) uL    Neutrophil Absolute 3.63 1.50 - 7.70 x10(3) uL    Lymphocyte Absolute 1.76 1.00 - 4.00 x10(3) uL    Monocyte Absolute 0.57 0.10 - 1.00 x10(3) uL    Eosinophil Absolute 0.31 0.00 - 0.70 x10(3) uL    Basophil Absolute 0.09 0.00 - 0.20 x10(3) uL    Immature Granulocyte Absolute 0.05 0.00 - 1.00 x10(3) uL    Neutrophil % 56.6 %    Lymphocyte % 27.5 %    Monocyte % 8.9 %    Eosinophil % 4.8 %    Basophil % 1.4 %    Immature Granulocyte % 0.8 %     ASSESSMENT AND PLAN:   Chato Royal is a 59 year old male who presents for a complete physical exam.   Encounter Diagnoses   Name Primary?    Physical exam, annual Yes    Pure hypercholesterolemia     Heartburn     Bronchitis     Seborrheic keratoses        Orders Placed This Encounter   Procedures    Comp Metabolic Panel (14)    Lipid Panel       Meds & Refills for this Visit:  Requested Prescriptions      No prescriptions requested or ordered in this encounter   Shingrix shingles vaccination.   Healthy diet.  Stay active.    Start inhaler 1 puff twice a day for 10 days then if feeling better use 1 puff once a day for 3 days and stop.  Use Pepcid AC as needed for heartburn.  See GI Dr. Dominguez for evaluation.  Do fasting blood work prior next visit in 6 months.  Follow-up sooner if your cough would not resolve.    GASTRO - INTERNAL    Pt's weight is Body mass index is 27.32 kg/m²., recommended low carb diet and aerobic exercise 30 minutes three times weekly. Health maintenance, will check fasting Lipids, CMP, CBC and PSA. Pt IS UTD with  screening colonoscopy. The patient indicates understanding of these issues and agrees to the plan.  6 months follow-up for med check.  The patient is asked to return for CPX in 1 year.

## 2024-06-26 NOTE — PATIENT INSTRUCTIONS
Shingrix shingles vaccination.   Healthy diet.  Stay active.   Start inhaler 1 puff twice a day for 10 days then if feeling better use 1 puff once a day for 3 days and stop.  Use Pepcid AC as needed for heartburn.  See GI Dr. Dominguez for evaluation.  Do fasting blood work prior next visit in 6 months.  Follow-up sooner if your cough would not resolve.        Dear Patients,  At [facility], patient care is our priority. Please review and acknowledge our office refill policy so we can best serve you.  EMG 36 REFILL POLICY  Allow 3 business days for refills; controlled substances may take longer.  Contact your pharmacy at least 5-7 business days prior to running out of medication and have them send an electronic request or submit through the \"request refill\" option thru your [employer] account. No need to do both, as multiple requests will create an automated [employer] message to notify of a denial for one of the duplicated requests, causing you undue confusion.   Refills are NOT addressed on weekends; covering physicians do not authorize routine medications on weekends.  No narcotics or controlled substances are refilled after noon on Fridays or by on call physicians.  By law, narcotics cannot be faxed or phoned into your pharmacy.  If your prescription is due for a refill, you may be due for a follow up appointment. Please call our office at [phone] to make an appointment or schedule an appointment via [employer].  To best provide you care, patients receiving routine medications need to be seen at least twice a year. Patients receiving narcotic/controlled substance medications need to be seen at least once every 3 months.  In the event that your preferred pharmacy does not have the requested medication in stock (ie Backordered), it is your responsibility to find another pharmacy that has the requested medication available. We will gladly send a new prescription to that pharmacy at your  request.  controlled substances may not be able to be filled out of state due to license restrictions.  If you have a planned trip, it's best to call your pharmacy at least 5-7 business days to prevent any delays in your medication refill.    Thank you for your understanding of this important matter.  Sincerely,    [facility], [address] [place] [id number]  Dept: [phone]

## 2024-06-26 NOTE — TELEPHONE ENCOUNTER
Spoke with pharmacy.  Lynn is requiring a PA or   Alternatives are breo ellipta, symbicort, or Advair.  Would you like to obtain the PA or prescribe an alternative preferred medication?

## 2024-06-26 NOTE — TELEPHONE ENCOUNTER
We can try Advair Diskus 250/50 1 puff twice a day brand-name and see if that would be better covered for him.  Thank you

## 2024-06-26 NOTE — TELEPHONE ENCOUNTER
From: Chato Royal  To: Amna Owens  Sent: 6/26/2024 10:01 AM CDT  Subject: Insurance denied your script     fluticasone-salmeterol.  So, can you offer an alternative or use prior auth power to fix? Help@!  Shlomo  [phone]

## 2024-06-27 ENCOUNTER — TELEPHONE (OUTPATIENT)
Dept: FAMILY MEDICINE CLINIC | Facility: CLINIC | Age: 59
End: 2024-06-27

## 2024-06-27 RX ORDER — FLUTICASONE PROPIONATE AND SALMETEROL XINAFOATE 115; 21 UG/1; UG/1
2 AEROSOL, METERED RESPIRATORY (INHALATION) 2 TIMES DAILY
Qty: 1 EACH | Refills: 0 | Status: SHIPPED | OUTPATIENT
Start: 2024-06-27

## 2024-06-27 NOTE — TELEPHONE ENCOUNTER
I called in Advair A to pharmacy.  He would have to use 2 puffs twice a day for 1 week then 1 puff twice a day for 1 week and then if feeling better can use it once a day for 3 days and stop.  Thanks.

## 2024-06-27 NOTE — TELEPHONE ENCOUNTER
Patient would update on advair. Patient was told advair HFA would be covered but that was not the one that was sent to pharmacy. Patient will be traveling tomorrow and was hoping to pick prescription by today. Please advise.

## 2024-06-27 NOTE — TELEPHONE ENCOUNTER
I can't say why advair diskus was sent instead of HFA.  It could've been an insurance issue.  Can he not use diskus?

## 2024-06-27 NOTE — TELEPHONE ENCOUNTER
Called and informed patient that new prescription was sent to pharmacy. Informed of medication instruction below.  Patient voiced understanding and agreed with plan of care. He has no further question/concern at this time.

## 2024-06-28 NOTE — TELEPHONE ENCOUNTER
Ins. Denied.  Called Appeals and was told it was approved for brand name while on the phone.    Notified patient.

## 2024-07-10 DIAGNOSIS — E78.00 PURE HYPERCHOLESTEROLEMIA: ICD-10-CM

## 2024-07-10 RX ORDER — ATORVASTATIN CALCIUM 20 MG/1
TABLET, FILM COATED ORAL
Qty: 120 TABLET | Refills: 1 | Status: SHIPPED | OUTPATIENT
Start: 2024-07-10

## 2024-07-10 NOTE — TELEPHONE ENCOUNTER
LOV: 6/26/2024  for: CPX  Patient advised to RTC on: 6 months.     Medication Quantity Refills Start End   atorvastatin 20 MG Oral Tab 120 tablet 0 4/10/2024 --   Sig:   TAKE 2 TABLETS BY MOUTH ON MONDAY, WEDNESDAY, AND FRIDAY NIGHTS AND TAKE 1 TABLET ON ALL OTHER DAYS

## 2024-09-10 ENCOUNTER — OFFICE VISIT (OUTPATIENT)
Dept: RHEUMATOLOGY | Facility: CLINIC | Age: 59
End: 2024-09-10
Payer: COMMERCIAL

## 2024-09-10 VITALS
SYSTOLIC BLOOD PRESSURE: 142 MMHG | BODY MASS INDEX: 27.11 KG/M2 | TEMPERATURE: 98 F | HEIGHT: 69 IN | WEIGHT: 183 LBS | HEART RATE: 80 BPM | DIASTOLIC BLOOD PRESSURE: 78 MMHG | RESPIRATION RATE: 16 BRPM | OXYGEN SATURATION: 97 %

## 2024-09-10 DIAGNOSIS — G89.29 CHRONIC MIDLINE LOW BACK PAIN WITH RIGHT-SIDED SCIATICA: ICD-10-CM

## 2024-09-10 DIAGNOSIS — L40.9 PSORIASIS: ICD-10-CM

## 2024-09-10 DIAGNOSIS — M79.675 PAIN IN TOES OF BOTH FEET: ICD-10-CM

## 2024-09-10 DIAGNOSIS — M25.50 POLYARTHRALGIA: ICD-10-CM

## 2024-09-10 DIAGNOSIS — R22.33 NODULE OF FINGER OF BOTH HANDS: ICD-10-CM

## 2024-09-10 DIAGNOSIS — M54.41 CHRONIC MIDLINE LOW BACK PAIN WITH RIGHT-SIDED SCIATICA: ICD-10-CM

## 2024-09-10 DIAGNOSIS — M19.90 INFLAMMATORY ARTHRITIS: Primary | ICD-10-CM

## 2024-09-10 DIAGNOSIS — M79.674 PAIN IN TOES OF BOTH FEET: ICD-10-CM

## 2024-09-10 DIAGNOSIS — M54.89 INFLAMMATORY BACK PAIN: ICD-10-CM

## 2024-09-10 PROCEDURE — 99204 OFFICE O/P NEW MOD 45 MIN: CPT | Performed by: INTERNAL MEDICINE

## 2024-09-10 NOTE — PROGRESS NOTES
?  RHEUMATOLOGY NEW PATIENT   Date of visit: 9/10/2024  ?  Chief Complaint   Patient presents with    [facility]     New pt. Dr. Owens referral. Has had joint pain in the past. Knee and fingers. Diagnosed with psoriasis. Slight swelling. Converted rapid score of 1.0       ?  ASSESSMENT, DISCUSSION & PLAN   Assessment:  1. Inflammatory arthritis    2. Psoriasis    3. Polyarthralgia    4. Inflammatory back pain    5. Chronic midline low back pain with right-sided sciatica    6. Nodule of finger of both hands    7. Pain in toes of both feet        Discussion:  Mr. Chato Royal is a 58 yo gentleman with history of psoriasis for several years and some worsened joint pain. On exam, he has some DIP abnormalities that correlate with nail changes concerning for psoriatic arthrititis. He lacks overt synovitis or tenosynovitis to warrant intervention prior to workup being completed. Seems like his skin psoriasis is fairly controlled but pt may require biologic intervention based off workup. Will get screening lumbar spine films and SI joint films to assess further.   Discussed at length disease process of psoriatic arthritis.  Discussed that he likely has a component of OA on top of possible inflammatory arthritis.  Will set up audio vs in person visit once work up completed to determine next steps in management.     Patient verbalized understanding of above instructions. No further questions at this time.    Code selection for this visit was based on time spent (48min) on date of service in preparing to see the patient, obtaining and/or reviewing separately obtained history, performing a medically appropriate examination, counseling and educating the patient/family/caregiver, ordering medications or testing, referring and communicating with other healthcare providers, documenting clinical information in the E HR, independently interpreting results and communicating results to the patient/family/caregiver and  care coordination with the patient's other providers.    ?  Plan:  Diagnoses and all orders for this visit:    Inflammatory arthritis  -     Sed Rate, Westergren (Automated) [E]; Future  -     C-Reactive Protein [E]; Future  -     Rheumatoid Arthritis Factor; Future  -     Cyclic Citrullinate Pep. IGG; Future  -     HLA B 27 Disease Association; Future  -     XR KNEE (1 OR 2 VIEWS), RIGHT (CPT=73560); Future  -     XR LUMBAR SPINE (MIN 4 VIEWS) (CPT=72110); Future  -     XR SACROILIAC JOINTS (MIN 3 VIEWS) (CPT=72202); Future  -     XR FOOT, COMPLETE (MIN 3 VIEWS), RIGHT (CPT=73630); Future  -     XR HAND BILAT (MIN 3 VIEWS) (CPT=73130-50); Future  -     Uric Acid; Future    Psoriasis  -     Sed Rate, Westergren (Automated) [E]; Future  -     C-Reactive Protein [E]; Future  -     Rheumatoid Arthritis Factor; Future  -     Cyclic Citrullinate Pep. IGG; Future  -     HLA B 27 Disease Association; Future  -     XR KNEE (1 OR 2 VIEWS), RIGHT (CPT=73560); Future  -     XR LUMBAR SPINE (MIN 4 VIEWS) (CPT=72110); Future  -     XR SACROILIAC JOINTS (MIN 3 VIEWS) (CPT=72202); Future  -     XR FOOT, COMPLETE (MIN 3 VIEWS), RIGHT (CPT=73630); Future  -     XR HAND BILAT (MIN 3 VIEWS) (CPT=73130-50); Future    Polyarthralgia  -     Sed Rate, Westergren (Automated) [E]; Future  -     C-Reactive Protein [E]; Future  -     Rheumatoid Arthritis Factor; Future  -     Cyclic Citrullinate Pep. IGG; Future  -     HLA B 27 Disease Association; Future  -     XR KNEE (1 OR 2 VIEWS), RIGHT (CPT=73560); Future  -     XR LUMBAR SPINE (MIN 4 VIEWS) (CPT=72110); Future  -     XR SACROILIAC JOINTS (MIN 3 VIEWS) (CPT=72202); Future  -     XR FOOT, COMPLETE (MIN 3 VIEWS), RIGHT (CPT=73630); Future  -     XR HAND BILAT (MIN 3 VIEWS) (CPT=73130-50); Future  -     Uric Acid; Future    Inflammatory back pain  -     Sed Rate, Westergren (Automated) [E]; Future  -     C-Reactive Protein [E]; Future  -     Rheumatoid Arthritis Factor; Future  -      Cyclic Citrullinate Pep. IGG; Future  -     HLA B 27 Disease Association; Future  -     XR LUMBAR SPINE (MIN 4 VIEWS) (CPT=72110); Future  -     XR SACROILIAC JOINTS (MIN 3 VIEWS) (CPT=72202); Future    Chronic midline low back pain with right-sided sciatica  -     XR LUMBAR SPINE (MIN 4 VIEWS) (CPT=72110); Future  -     XR SACROILIAC JOINTS (MIN 3 VIEWS) (CPT=72202); Future    Nodule of finger of both hands  -     Sed Rate, Radharen (Automated) [E]; Future  -     C-Reactive Protein [E]; Future  -     Rheumatoid Arthritis Factor; Future  -     Cyclic Citrullinate Pep. IGG; Future  -     HLA B 27 Disease Association; Future  -     XR HAND BILAT (MIN 3 VIEWS) (CPT=73130-50); Future  -     Uric Acid; Future    Pain in toes of both feet  -     Uric Acid; Future        Return in about 2 weeks (around 9/24/2024).  ?  HPI   Chato Royal is a 59 year old male with the following active problems who is referred for rheumatologic evaluation due to joint pain and psoriasis.    Developing worsened arthritis over time  Having knee pain hx of arthroscopy  Hx of trigger thumb in the past with injection but still having some other pain  Some stiffness but stays active    Hx of psoriasis, diagnosed in 30's. Initially over the knees, had some over the elbows. Very little scalp involvement above the ears.  Does have brittle/thinner nails - does have pitting.   Seen by [facility] in the past. Used topicals in the past which helped.  Takes biotin 3x/week.     Right knee>left. Can get swelling more on the right. Sometimes difficulty going up/down stairs.   Ibuprofen does help with pain. 400mg daily, not often has to take more than that.   Nodules over left pinky finger over the DIP- new in the past 6 months.   Can get some toe pain- typically b/l big toe base, can get swollen/red.   + chronic lower back issues, can get sciatica/pain in the buttock on the right and back leg. Takes ibuprofen and stretch which helps.      Stretches for at least 15-20 minutes each morning  Lift weights- 3x/week   Walks 2x/week at least 1-1.5 miles   GolSuncore about once weekly - tries to walk course.     Hx of GERD- has plans for repeat EGD in 2 weeks. Last cscope in 2018 normal. Some difficulty swallowing at times, typically food triggered. Previously took PPI which helped then stopped.   Hx of prior diarrhea, improved with dietary changes. Denies constipation. Has had blood in stools in the past.   Hx of deviated septum. Seen by ENT once, had significant sinus infection requiring two courses due to severity. Still gets once every 2 months. Denies epsitaxis.       + morning stiffness, 5-7 minutes, can felt somewhat diffusely but primarily the legs   Some wrist pain   + generalized hair thinning, but not bothersome   + rare achilles tendon pain thinks related to activity   + some weakness in the hands. And some weakness in the legs.     No history of significant pain or swelling of the MCPs or pain or swelling of the ankles.  The patient denies oral or nasal ulcers, photosensitive rash, Raynaud's phenomenon, prior hematologic abnormality, prior renal or liver disease, or history of seizures.  No history of prior blood clot in the legs or lungs, strokes or ischemic phenomenon.  Denies nonhealing ulcers on the fingertips.  The patient denies any history of uveitis, nodular painful shin bruises, plantar fascitis, or pain awakening the patient from sleep.  There are no symptoms of severe dry eyes, dry mouth, recurrent cavities, or swelling of the cheeks or under the jawbone.   No fevers, chills, lymphadenopathy, night sweats, unexpected weight loss, easy bruising or bleeding  Denies chronic cough or hemoptysis.        Family hx:  Father  from prostate cancer at 67; smoker and hx of emphysema   Mother  from aortic aneurysm at age 91  Brother and sister without psoriasis  Brother with RA/arthritis   Daughter with allergies/asthma   Varicose veins in  mother and sister       Past Medical History:  Past Medical History:    Glossitis    Glossodynia    High cholesterol    Hypercholesterolemia    Orchitis and epididymitis, unspecified    Psoriasis     Past Surgical History:  Past Surgical History:   Procedure Laterality Date    Colonoscopy  2009    normal    Femur/knee surg unlisted      arthroscopy knee left    Knee arthroscopy      Knee scope,med/lat menisectomy Left 1/14/2015    Procedure: ARTHROSCOPY LEFT KNEE AND DEBRIDEMENT W/ MEDIAL MENISCECTOMY;  Surgeon: Octavio Seaman MD;  Location: [facility]    Other surgical history      injection of neurolytic substance epidural    Other surgical history  2013    right knee scope     Family History:  Family History   Problem Relation Age of Onset    Cancer Father 66        Prostate    Other (Other) Mother         hearing loss    Other (aaa) Mother         AAA    Other (Other) Maternal Grandfather         liver cirrhosis    Other (Other) Paternal Grandfather         liver cirrhosis    Cancer Brother         skin ca    Heart Disorder Maternal Aunt         MI     Social History:  Social History     Socioeconomic History    Marital status:    Tobacco Use    Smoking status: Former     Types: Cigars    Smokeless tobacco: Never   Vaping Use    Vaping status: Never Used   Substance and Sexual Activity    Alcohol use: Yes     Comment: 3-5 times a week    Drug use: No    Sexual activity: Yes     Partners: Female   Other Topics Concern    Caffeine Concern No     Comment: 24 oz    Exercise Yes     Comment: regular     Seat Belt Yes     Medications:  Outpatient Medications Marked as Taking for the 9/10/24 encounter (Office Visit) with Nallely Geronimo,    Medication Sig Dispense Refill    atorvastatin 20 MG Oral Tab TAKE 2 TABLETS BY MOUTH ON MONDAY, WEDNESDAY, AND FRIDAY NIGHTS AND TAKE 1 TABLET ON ALL OTHER DAYS 120 tablet 1    tadalafil 5 MG Oral Tab TAKE ONE TABLET BY MOUTH DAILY AS NEEDED. NO ADDITIONAL REFILLS UNTIL  AFTER FOLLOW UP VISIT 30 tablet 11    cimetidine 400 MG Oral Tab Take 1 tablet (400 mg total) by mouth 2 (two) times daily.      VTAMA 1 % External Cream Apply to affected area throughout body once daily.      fluticasone propionate 50 MCG/ACT Nasal Suspension 2 sprays by Nasal route daily. 16 g 3    aspirin 81 MG Oral Tab EC Take 1 tablet (81 mg total) by mouth daily.      ibuprofen 200 MG Oral Tab Take 2 tablets (400 mg total) by mouth every 6 (six) hours as needed for Pain.      Multiple Vitamins-Minerals (MULTIVITAMIN ADULTS 50+) Oral Tab Take 1 tablet by mouth daily.       Modified Medications    No medications on file     Medications Discontinued During This Encounter   Medication Reason    ADVAIR DISKUS 250-50 MCG/ACT Inhalation Aerosol Powder, Breath Activated     fluticasone-salmeterol (ADVAIR HFA) 115-21 MCG/ACT Inhalation Aerosol      ?  ?  Allergies:  Allergies   Allergen Reactions    Mold     Pollen      ?  REVIEW OF SYSTEMS   ?  Review of Systems   Constitutional:  Negative for chills, fever and weight loss.   HENT:  Positive for congestion. Negative for nosebleeds.    Eyes:  Negative for pain and redness.   Respiratory:  Negative for cough, hemoptysis and shortness of breath.    Cardiovascular:  Negative for chest pain, palpitations and leg swelling.   Gastrointestinal:  Positive for diarrhea and heartburn. Negative for abdominal pain, blood in stool and constipation.   Genitourinary:  Positive for frequency. Negative for dysuria, hematuria and urgency.   Musculoskeletal:  Positive for back pain and joint pain. Negative for myalgias and neck pain.   Skin:  Positive for itching and rash.   Neurological:  Positive for weakness. Negative for dizziness, tingling, seizures and headaches.   Endo/Heme/Allergies:  Positive for environmental allergies. Does not bruise/bleed easily.     PHYSICAL EXAM   Today's Vitals:  Temperature Blood Pressure Heart Rate Resp Rate SpO2   Temp: 98 °F (36.7 °C) BP: 142/78  Pulse: 80 Resp: 16 SpO2: 97 %   ?  Current Weight Height BMI BSA Pain   Wt Readings from Last 1 Encounters:   09/10/24 183 lb (83 kg)    Height: 5' 9\" (175.3 cm) Body mass index is 27.02 kg/m². Body surface area is 1.99 meters squared.         Physical Exam  Vitals and nursing note reviewed.   Constitutional:       General: He is not in acute distress.     Appearance: Normal appearance. He is well-developed. He is not diaphoretic.   HENT:      Head: Normocephalic.   Eyes:      General: No scleral icterus.     Extraocular Movements: Extraocular movements intact.      Conjunctiva/sclera: Conjunctivae normal.   Neck:      Vascular: No JVD.      Trachea: No tracheal deviation.   Cardiovascular:      Rate and Rhythm: Normal rate and regular rhythm.      Heart sounds: Normal heart sounds. No murmur heard.  Pulmonary:      Effort: Pulmonary effort is normal. No respiratory distress.      Breath sounds: Normal breath sounds. No wheezing.   Musculoskeletal:         General: Deformity present. No swelling or tenderness.      Cervical back: Neck supple.      Comments: Scattered DIP nodules- pinky finger worst   No swelling, tenderness, redness or restriction of motion of the PIPs, MCPs, wrists, elbows, ankles, or joints of the feet.  Bilateral shoulders with full ROM, no evidence of impingement with provocative maneuvers.  SI joints non-tender. No spinous process tenderness or tenderness over the greater trochanters.  Bilateral knees without medial joint line tenderness, mild crepitus, no effusion.slight patellar tendon tenderness    Lymphadenopathy:      Cervical: No cervical adenopathy.   Skin:     General: Skin is warm and dry.      Findings: No erythema or rash.      Comments: Nail changes over fingernail. Toenails grossly normal.  Some possible psoriasis over MCPs and fingers.  No malar rash  No periungal erythema    Neurological:      Mental Status: He is alert and oriented to person, place, and time.      Cranial  Nerves: No cranial nerve deficit.      Gait: Gait normal.   Psychiatric:         Mood and Affect: Mood normal.         Behavior: Behavior normal.       ?  Radiology review:      Narrative   PROCEDURE:  XR FINGER(S) (MIN 2 VIEWS), RIGHT THUMB (CPT=73140)     INDICATIONS:  M79.644 Thumb pain, right     COMPARISON:  None.     TECHNIQUE:  Three views of the finger were obtained.     PATIENT STATED HISTORY: (As transcribed by Technologist)  Patient is having an orthopedic evaluation.  Patient complains of pain in the carpal-metacarpal joint of his Right thumb for 7 weeks with no known injury.      FINDINGS:  Negative for fracture, dislocation, deformity or other acute bony abnormality. Osteoarthritic changes are noted, minimal degree.  No soft tissue abnormalities.     Impression   CONCLUSION:  Minimal osteoarthritic changes are present. No acute fracture or other acute bony process.        LOCATION:  [facility]        Dictated by (CST): Chang Balbuena MD on 9/25/2023 at 12:01 PM      Finalized by (CST): Chang Balbuena MD on 9/25/2023 at 12:54 PM       Narrative   PROCEDURE:  XR KNEE ROUTINE (3 VIEWS), RIGHT (CPT=73562)     TECHNIQUE:  Three views were obtained including patellar view.     COMPARISON:  JOANNE, XR, KNEE/PATELLA (MIN 3 VW), RIGHT, 7/27/2011, 14:28.     INDICATIONS:     PATIENT STATED HISTORY: (As transcribed by Technologist)  Patient presents with right knee swelling and soreness for two weeks. No known trauma or injury.          FINDINGS:    Mild joint effusion.  Enthesopathy seen at the superior patella.  No acute displaced osseous fracture or dislocation.  Mild degenerative spurring at the tibial spines.      Impression   CONCLUSION:  No acute osseous abnormality is evident.        Dictated by: Stromberg, LeRoy, MD on 1/02/2020 at 20:37      Approved by: Stromberg, LeRoy, MD on 1/02/2020 at 20:38         Labs:  Lab Results   Component Value Date    WBC 6.4 06/14/2024    RBC 4.85 06/14/2024    HGB 14.8  06/14/2024    HCT 45.0 06/14/2024    .0 06/14/2024    MPV 11.9 11/09/2012    MCV 92.8 06/14/2024    MCH 30.5 06/14/2024    MCHC 32.9 06/14/2024    RDW 12.3 06/14/2024    NEPRELIM 3.63 06/14/2024    NEPERCENT 56.6 06/14/2024    LYPERCENT 27.5 06/14/2024    MOPERCENT 8.9 06/14/2024    EOPERCENT 4.8 06/14/2024    BAPERCENT 1.4 06/14/2024    NE 3.63 06/14/2024    LYMABS 1.76 06/14/2024    MOABSO 0.57 06/14/2024    EOABSO 0.31 06/14/2024    BAABSO 0.09 06/14/2024     Lab Results   Component Value Date    GLU 88 06/14/2024    BUN 17 06/14/2024    BUNCREA 18.0 09/07/2022    CREATSERUM 1.18 06/14/2024    ANIONGAP 8 06/14/2024    GFR 77 10/13/2017    GFRNAA 72 02/23/2022    GFRAA 83 02/23/2022    CA 9.2 06/14/2024    OSMOCALC 291 06/14/2024    ALKPHO 65 06/14/2024    AST 17 06/14/2024    ALT 30 06/14/2024    BILT 0.4 06/14/2024    TP 7.9 06/14/2024    ALB 4.3 06/14/2024    GLOBULIN 3.6 06/14/2024    AGRATIO 1.8 08/03/2011     06/14/2024    K 4.0 06/14/2024     06/14/2024    CO2 22.0 06/14/2024       Additional Labs:    04/2023  Hepatitis B nonreactive  Hepatitis C nonreactive  TB negative    09/2022   RF negative  ESR 6 normal  CRP normal    Nallely Geronimo, DO  [facility]  9/10/2024

## 2024-09-13 ENCOUNTER — HOSPITAL ENCOUNTER (OUTPATIENT)
Dept: GENERAL RADIOLOGY | Age: 59
Discharge: HOME | End: 2024-09-13
Attending: INTERNAL MEDICINE
Payer: COMMERCIAL

## 2024-09-13 DIAGNOSIS — M25.50 POLYARTHRALGIA: ICD-10-CM

## 2024-09-13 DIAGNOSIS — M19.90 INFLAMMATORY ARTHRITIS: ICD-10-CM

## 2024-09-13 DIAGNOSIS — R22.33 NODULE OF FINGER OF BOTH HANDS: ICD-10-CM

## 2024-09-13 DIAGNOSIS — L40.9 PSORIASIS: ICD-10-CM

## 2024-09-13 DIAGNOSIS — G89.29 CHRONIC MIDLINE LOW BACK PAIN WITH RIGHT-SIDED SCIATICA: ICD-10-CM

## 2024-09-13 DIAGNOSIS — M54.41 CHRONIC MIDLINE LOW BACK PAIN WITH RIGHT-SIDED SCIATICA: ICD-10-CM

## 2024-09-13 DIAGNOSIS — M54.89 INFLAMMATORY BACK PAIN: ICD-10-CM

## 2024-09-13 PROCEDURE — 73560 X-RAY EXAM OF KNEE 1 OR 2: CPT | Performed by: INTERNAL MEDICINE

## 2024-09-13 PROCEDURE — 73130 X-RAY EXAM OF HAND: CPT | Performed by: INTERNAL MEDICINE

## 2024-09-13 PROCEDURE — 72202 X-RAY EXAM SI JOINTS 3/> VWS: CPT | Performed by: INTERNAL MEDICINE

## 2024-09-13 PROCEDURE — 72110 X-RAY EXAM L-2 SPINE 4/>VWS: CPT | Performed by: INTERNAL MEDICINE

## 2024-09-13 PROCEDURE — 73630 X-RAY EXAM OF FOOT: CPT | Performed by: INTERNAL MEDICINE

## 2024-09-14 DIAGNOSIS — N52.9 ERECTILE DYSFUNCTION, UNSPECIFIED ERECTILE DYSFUNCTION TYPE: ICD-10-CM

## 2024-09-19 RX ORDER — TADALAFIL 5 MG/1
TABLET ORAL
Qty: 30 TABLET | Refills: 0 | Status: SHIPPED | OUTPATIENT
Start: 2024-09-19

## 2024-09-19 NOTE — TELEPHONE ENCOUNTER
Pt's last OV was on 8/7/23.   No future appts.   Refill sent.   [employer] message sent.

## 2024-09-20 ENCOUNTER — LAB ENCOUNTER (OUTPATIENT)
Dept: LAB | Age: 59
End: 2024-09-20
Attending: INTERNAL MEDICINE
Payer: COMMERCIAL

## 2024-09-20 DIAGNOSIS — L40.9 PSORIASIS: ICD-10-CM

## 2024-09-20 DIAGNOSIS — R22.33 NODULE OF FINGER OF BOTH HANDS: ICD-10-CM

## 2024-09-20 DIAGNOSIS — M19.90 INFLAMMATORY ARTHRITIS: ICD-10-CM

## 2024-09-20 DIAGNOSIS — M54.89 INFLAMMATORY BACK PAIN: ICD-10-CM

## 2024-09-20 DIAGNOSIS — M79.675 PAIN IN TOES OF BOTH FEET: ICD-10-CM

## 2024-09-20 DIAGNOSIS — M79.674 PAIN IN TOES OF BOTH FEET: ICD-10-CM

## 2024-09-20 DIAGNOSIS — M25.50 POLYARTHRALGIA: ICD-10-CM

## 2024-09-20 LAB
CRP SERPL-MCNC: <0.4 MG/DL (ref ?–0.5)
ERYTHROCYTE [SEDIMENTATION RATE] IN BLOOD: 3 MM/HR
RHEUMATOID FACT SERPL-ACNC: <3.5 IU/ML (ref ?–14)
URATE SERPL-MCNC: 6.5 MG/DL

## 2024-09-20 PROCEDURE — 85652 RBC SED RATE AUTOMATED: CPT

## 2024-09-20 PROCEDURE — 86200 CCP ANTIBODY: CPT

## 2024-09-20 PROCEDURE — 81374 HLA I TYPING 1 ANTIGEN LR: CPT

## 2024-09-20 PROCEDURE — 86431 RHEUMATOID FACTOR QUANT: CPT

## 2024-09-20 PROCEDURE — 86140 C-REACTIVE PROTEIN: CPT

## 2024-09-20 PROCEDURE — 84550 ASSAY OF BLOOD/URIC ACID: CPT

## 2024-09-23 LAB — CCP IGG SERPL-ACNC: 1.1 U/ML (ref 0–6.9)

## 2024-09-25 LAB
HLA B27 SCREENING: POSITIVE
HLA B27 SCREENING: POSITIVE

## 2024-10-04 ENCOUNTER — TELEPHONE (OUTPATIENT)
Dept: RHEUMATOLOGY | Facility: CLINIC | Age: 59
End: 2024-10-04

## 2024-10-04 NOTE — TELEPHONE ENCOUNTER
Pt called at 8:35am stating he went to the [place] office by mistake, pt would have been 15 min late by this time, pt was put back on cxl list

## 2024-10-10 ENCOUNTER — MED REC SCAN ONLY (OUTPATIENT)
Dept: FAMILY MEDICINE CLINIC | Facility: CLINIC | Age: 59
End: 2024-10-10

## 2024-10-15 ENCOUNTER — OFFICE VISIT (OUTPATIENT)
Dept: RHEUMATOLOGY | Facility: CLINIC | Age: 59
End: 2024-10-15
Payer: COMMERCIAL

## 2024-10-15 VITALS
HEIGHT: 69 IN | TEMPERATURE: 98 F | SYSTOLIC BLOOD PRESSURE: 138 MMHG | BODY MASS INDEX: 27.55 KG/M2 | OXYGEN SATURATION: 95 % | WEIGHT: 186 LBS | HEART RATE: 70 BPM | DIASTOLIC BLOOD PRESSURE: 74 MMHG | RESPIRATION RATE: 16 BRPM

## 2024-10-15 DIAGNOSIS — M19.90 INFLAMMATORY ARTHRITIS: Primary | ICD-10-CM

## 2024-10-15 DIAGNOSIS — L40.9 PSORIASIS: ICD-10-CM

## 2024-10-15 DIAGNOSIS — Z15.89 HLA B27 (HLA B27 POSITIVE): ICD-10-CM

## 2024-10-15 DIAGNOSIS — M25.50 POLYARTHRALGIA: ICD-10-CM

## 2024-10-15 DIAGNOSIS — M54.89 INFLAMMATORY BACK PAIN: ICD-10-CM

## 2024-10-15 DIAGNOSIS — M51.369 DEGENERATION OF INTERVERTEBRAL DISC OF LUMBAR REGION, UNSPECIFIED WHETHER PAIN PRESENT: ICD-10-CM

## 2024-10-15 PROCEDURE — 99214 OFFICE O/P EST MOD 30 MIN: CPT | Performed by: INTERNAL MEDICINE

## 2024-10-15 NOTE — PROGRESS NOTES
?  RHEUMATOLOGY FOLLOW UP   Date of visit:10/15/2024  ?  Chief Complaint   Patient presents with    Follow - Up     One month f/u. No new or worsened symptoms. Here to discuss test results and go over next steps in management. Converted rapid score of 0.3       ?  ASSESSMENT, DISCUSSION & PLAN   Assessment:  1. Inflammatory arthritis    2. Psoriasis    3. Polyarthralgia    4. Inflammatory back pain    5. Degeneration of intervertebral disc of lumbar region, unspecified whether pain present    6. HLA B27 (HLA B27 positive)      Discussion:  Mr. Chato Royal is a 58 yo gentleman with history of psoriasis for several years and some worsened joint pain. On exam, he has some DIP abnormalities that correlate with nail changes concerning for psoriatic arthrititis. He lacks overt synovitis or tenosynovitis on exam.  His workup showed DDD of the lumbar spine. Normal SI joints. Mild arthritic changes and enthesopathy for the knee. Xrays of hands reported as normal but looks like there may be some early arthritic changes.   Labs did reveal a positive HLAB27.   Discussed at length further signs/symptoms of HLAB27 disease and SpA.   Given the mild joint pain, will have him try diclofenac twice daily with food. Avoid other NSAIDs while on the diclofenac. Okay to take tylenol.   He will send me a message in about 2 weeks with an update. May consider long term diclofenac as needed.  He will also consider physical therapy for the lower back.  Uric acid was borderline- discuss dietary changes as well as tart cherry extract.   Encouraged pt to keep me posted if symptoms change/worsen in the meantime.  He will follow back in about a year.   But instructed to update me if symptoms worse, and will try to fit in sooner.     Patient verbalized understanding of above instructions. No further questions at this time.    Code selection for this visit was based on time spent (30min) on date of service in preparing to see the patient,  obtaining and/or reviewing separately obtained history, performing a medically appropriate examination, counseling and educating the patient/family/caregiver, ordering medications or testing, referring and communicating with other healthcare providers, documenting clinical information in the [facility], independently interpreting results and communicating results to the patient/family/caregiver and care coordination with the patient's other providers.    ?  Plan:  Diagnoses and all orders for this visit:    Inflammatory arthritis  -     diclofenac 50 MG Oral Tab EC; Take 1 tablet (50 mg total) by mouth 2 (two) times daily with meals.    Psoriasis  -     diclofenac 50 MG Oral Tab EC; Take 1 tablet (50 mg total) by mouth 2 (two) times daily with meals.    Polyarthralgia  -     diclofenac 50 MG Oral Tab EC; Take 1 tablet (50 mg total) by mouth 2 (two) times daily with meals.    Inflammatory back pain  -     diclofenac 50 MG Oral Tab EC; Take 1 tablet (50 mg total) by mouth 2 (two) times daily with meals.  -     Physical Therapy Referral - [facility]    Degeneration of intervertebral disc of lumbar region, unspecified whether pain present  -     diclofenac 50 MG Oral Tab EC; Take 1 tablet (50 mg total) by mouth 2 (two) times daily with meals.  -     Physical Therapy Referral - [facility]    HLA B27 (HLA B27 positive)          Return in about 1 year (around 10/15/2025).  ?  HPI   Chato Royal is a 59 year old male with the following active problems who is referred for rheumatologic evaluation due to joint pain and psoriasis. He presents to discuss test results today.    Reports no change in symptoms since his last visit.     HPI from initial consultation  referred for rheumatologic evaluation due to joint pain and psoriasis.    Developing worsened arthritis over time  Having knee pain hx of arthroscopy  Hx of trigger thumb in the past with injection but still having some other pain  Some stiffness but stays  active    Hx of psoriasis, diagnosed in 30's. Initially over the knees, had some over the elbows. Very little scalp involvement above the ears.  Does have brittle/thinner nails - does have pitting.   Seen by [facility] in the past. Used topicals in the past which helped.  Takes biotin 3x/week.     Right knee>left. Can get swelling more on the right. Sometimes difficulty going up/down stairs.   Ibuprofen does help with pain. 400mg daily, not often has to take more than that.   Nodules over left pinky finger over the DIP- new in the past 6 months.   Can get some toe pain- typically b/l big toe base, can get swollen/red.   + chronic lower back issues, can get sciatica/pain in the buttock on the right and back leg. Takes ibuprofen and stretch which helps.     Stretches for at least 15-20 minutes each morning  Lift weights- 3x/week   Walks 2x/week at least 1-1.5 miles   [employer] about once weekly - tries to walk course.     Hx of GERD- has plans for repeat EGD in 2 weeks. Last cscope in 2018 normal. Some difficulty swallowing at times, typically food triggered. Previously took PPI which helped then stopped.   Hx of prior diarrhea, improved with dietary changes. Denies constipation. Has had blood in stools in the past.   Hx of deviated septum. Seen by ENT once, had significant sinus infection requiring two courses due to severity. Still gets once every 2 months. Denies epsitaxis.       + morning stiffness, 5-7 minutes, can felt somewhat diffusely but primarily the legs   Some wrist pain   + generalized hair thinning, but not bothersome   + rare achilles tendon pain thinks related to activity   + some weakness in the hands. And some weakness in the legs.     No history of significant pain or swelling of the MCPs or pain or swelling of the ankles.  The patient denies oral or nasal ulcers, photosensitive rash, Raynaud's phenomenon, prior hematologic abnormality, prior renal or liver disease, or history of seizures.  No  history of prior blood clot in the legs or lungs, strokes or ischemic phenomenon.  Denies nonhealing ulcers on the fingertips.  The patient denies any history of uveitis, nodular painful shin bruises, plantar fascitis, or pain awakening the patient from sleep.  There are no symptoms of severe dry eyes, dry mouth, recurrent cavities, or swelling of the cheeks or under the jawbone.   No fevers, chills, lymphadenopathy, night sweats, unexpected weight loss, easy bruising or bleeding  Denies chronic cough or hemoptysis.        Family hx:  Father  from prostate cancer at 67; smoker and hx of emphysema   Mother  from aortic aneurysm at age 91  Brother and sister without psoriasis  Brother with RA/arthritis   Daughter with allergies/asthma   Varicose veins in mother and sister       Past Medical History:  Past Medical History:    Glossitis    Glossodynia    High cholesterol    Hypercholesterolemia    Orchitis and epididymitis, unspecified    Psoriasis     Past Surgical History:  Past Surgical History:   Procedure Laterality Date    Colonoscopy      normal    Femur/knee surg unlisted      arthroscopy knee left    Knee arthroscopy      Knee scope,med/lat menisectomy Left 2015    Procedure: ARTHROSCOPY LEFT KNEE AND DEBRIDEMENT W/ MEDIAL MENISCECTOMY;  Surgeon: Octavio Seaman MD;  Location: [facility]    Other surgical history      injection of neurolytic substance epidural    Other surgical history      right knee scope     Family History:  Family History   Problem Relation Age of Onset    Cancer Father 66        Prostate    Other (Other) Mother         hearing loss    Other (aaa) Mother         AAA    Other (Other) Maternal Grandfather         liver cirrhosis    Other (Other) Paternal Grandfather         liver cirrhosis    Cancer Brother         skin ca    Heart Disorder Maternal Aunt         MI     Social History:  Social History     Socioeconomic History    Marital status:    Tobacco Use     Smoking status: Former     Types: Cigars    Smokeless tobacco: Never   Vaping Use    Vaping status: Never Used   Substance and Sexual Activity    Alcohol use: Yes     Comment: 3-5 times a week    Drug use: No    Sexual activity: Yes     Partners: Female   Other Topics Concern    Caffeine Concern No     Comment: 24 oz    Exercise Yes     Comment: regular     Seat Belt Yes     Medications:  Outpatient Medications Marked as Taking for the 10/15/24 encounter (Office Visit) with Nallely Geronimo DO   Medication Sig Dispense Refill    diclofenac 50 MG Oral Tab EC Take 1 tablet (50 mg total) by mouth 2 (two) times daily with meals. 30 tablet 0    TADALAFIL 5 MG Oral Tab TAKE ONE TABLET BY MOUTH DAILY AS NEEDED. NO ADDITIONAL REFILLS UNTIL AFTER FOLLOW UP VISIT 30 tablet 0    atorvastatin 20 MG Oral Tab TAKE 2 TABLETS BY MOUTH ON MONDAY, WEDNESDAY, AND FRIDAY NIGHTS AND TAKE 1 TABLET ON ALL OTHER DAYS 120 tablet 1    cimetidine 400 MG Oral Tab Take 1 tablet (400 mg total) by mouth 2 (two) times daily.      VTAMA 1 % External Cream Apply to affected area throughout body once daily.      fluticasone propionate 50 MCG/ACT Nasal Suspension 2 sprays by Nasal route daily. 16 g 3    aspirin 81 MG Oral Tab EC Take 1 tablet (81 mg total) by mouth daily.      ibuprofen 200 MG Oral Tab Take 2 tablets (400 mg total) by mouth every 6 (six) hours as needed for Pain.      Multiple Vitamins-Minerals (MULTIVITAMIN ADULTS 50+) Oral Tab Take 1 tablet by mouth daily.       Modified Medications    No medications on file     There are no discontinued medications.    ?  ?  Allergies:  Allergies   Allergen Reactions    Mold     Pollen      ?  REVIEW OF SYSTEMS   ?  Review of Systems   Constitutional:  Negative for chills, fever and weight loss.   HENT:  Positive for congestion. Negative for nosebleeds.    Eyes:  Negative for pain and redness.   Respiratory:  Negative for cough, hemoptysis and shortness of breath.    Cardiovascular:  Negative for  chest pain, palpitations and leg swelling.   Gastrointestinal:  Positive for diarrhea and heartburn. Negative for abdominal pain, blood in stool and constipation.   Genitourinary:  Positive for frequency. Negative for dysuria, hematuria and urgency.   Musculoskeletal:  Positive for back pain and joint pain. Negative for myalgias and neck pain.   Skin:  Positive for itching and rash.   Neurological:  Positive for weakness. Negative for dizziness, tingling, seizures and headaches.   Endo/Heme/Allergies:  Positive for environmental allergies. Does not bruise/bleed easily.     PHYSICAL EXAM   Today's Vitals:  Temperature Blood Pressure Heart Rate Resp Rate SpO2   Temp: 97.8 °F (36.6 °C) BP: 138/74 Pulse: 70 Resp: 16 SpO2: 95 %   ?  Current Weight Height BMI BSA Pain   Wt Readings from Last 1 Encounters:   10/15/24 186 lb (84.4 kg)    Height: 5' 9\" (175.3 cm) Body mass index is 27.47 kg/m². Body surface area is 2 meters squared.         Physical Exam  Vitals and nursing note reviewed.   Constitutional:       General: He is not in acute distress.     Appearance: Normal appearance. He is well-developed. He is not diaphoretic.   HENT:      Head: Normocephalic.   Eyes:      General: No scleral icterus.     Extraocular Movements: Extraocular movements intact.      Conjunctiva/sclera: Conjunctivae normal.   Neck:      Vascular: No JVD.      Trachea: No tracheal deviation.   Pulmonary:      Effort: Pulmonary effort is normal. No respiratory distress.   Musculoskeletal:      Comments: (Prior exam)  Scattered DIP nodules- pinky finger worst   No swelling, tenderness, redness or restriction of motion of the PIPs, MCPs, wrists, elbows, ankles, or joints of the feet.  Bilateral shoulders with full ROM, no evidence of impingement with provocative maneuvers.  SI joints non-tender. No spinous process tenderness or tenderness over the greater trochanters.  Bilateral knees without medial joint line tenderness, mild crepitus, no  effusion.slight patellar tendon tenderness    Skin:     General: Skin is warm and dry.      Findings: No erythema or rash.      Comments: Nail changes over fingernail. Toenails grossly normal.  Some possible psoriasis over MCPs and fingers.  No malar rash  No periungal erythema    Neurological:      Mental Status: He is alert and oriented to person, place, and time.      Cranial Nerves: No cranial nerve deficit.      Gait: Gait normal.   Psychiatric:         Mood and Affect: Mood normal.         Behavior: Behavior normal.       ?  Radiology review:      Narrative   PROCEDURE:  XR FINGER(S) (MIN 2 VIEWS), RIGHT THUMB (CPT=73140)     INDICATIONS:  M79.644 Thumb pain, right     COMPARISON:  None.     TECHNIQUE:  Three views of the finger were obtained.     PATIENT STATED HISTORY: (As transcribed by Technologist)  Patient is having an orthopedic evaluation.  Patient complains of pain in the carpal-metacarpal joint of his Right thumb for 7 weeks with no known injury.      FINDINGS:  Negative for fracture, dislocation, deformity or other acute bony abnormality. Osteoarthritic changes are noted, minimal degree.  No soft tissue abnormalities.     Impression   CONCLUSION:  Minimal osteoarthritic changes are present. No acute fracture or other acute bony process.        LOCATION:  [facility]        Dictated by (CST): Chang Balbuena MD on 9/25/2023 at 12:01 PM      Finalized by (CST): Chang Balbuena MD on 9/25/2023 at 12:54 PM       Narrative   PROCEDURE:  XR KNEE ROUTINE (3 VIEWS), RIGHT (CPT=73562)     TECHNIQUE:  Three views were obtained including patellar view.     COMPARISON:  SCOTTY RUBIO, KNEE/PATELLA (MIN 3 VW), RIGHT, 7/27/2011, 14:28.     INDICATIONS:     PATIENT STATED HISTORY: (As transcribed by Technologist)  Patient presents with right knee swelling and soreness for two weeks. No known trauma or injury.          FINDINGS:    Mild joint effusion.  Enthesopathy seen at the superior patella.  No acute displaced osseous  fracture or dislocation.  Mild degenerative spurring at the tibial spines.      Impression   CONCLUSION:  No acute osseous abnormality is evident.        Dictated by: Stromberg, LeRoy, MD on 1/02/2020 at 20:37      Approved by: Stromberg, LeRoy, MD on 1/02/2020 at 20:38         Labs:  Lab Results   Component Value Date    WBC 6.4 06/14/2024    RBC 4.85 06/14/2024    HGB 14.8 06/14/2024    HCT 45.0 06/14/2024    .0 06/14/2024    MPV 11.9 11/09/2012    MCV 92.8 06/14/2024    MCH 30.5 06/14/2024    MCHC 32.9 06/14/2024    RDW 12.3 06/14/2024    NEPRELIM 3.63 06/14/2024    NEPERCENT 56.6 06/14/2024    LYPERCENT 27.5 06/14/2024    MOPERCENT 8.9 06/14/2024    EOPERCENT 4.8 06/14/2024    BAPERCENT 1.4 06/14/2024    NE 3.63 06/14/2024    LYMABS 1.76 06/14/2024    MOABSO 0.57 06/14/2024    EOABSO 0.31 06/14/2024    BAABSO 0.09 06/14/2024     Lab Results   Component Value Date    GLU 88 06/14/2024    BUN 17 06/14/2024    BUNCREA 18.0 09/07/2022    CREATSERUM 1.18 06/14/2024    ANIONGAP 8 06/14/2024    GFR 77 10/13/2017    GFRNAA 72 02/23/2022    GFRAA 83 02/23/2022    CA 9.2 06/14/2024    OSMOCALC 291 06/14/2024    ALKPHO 65 06/14/2024    AST 17 06/14/2024    ALT 30 06/14/2024    BILT 0.4 06/14/2024    TP 7.9 06/14/2024    ALB 4.3 06/14/2024    GLOBULIN 3.6 06/14/2024    AGRATIO 1.8 08/03/2011     06/14/2024    K 4.0 06/14/2024     06/14/2024    CO2 22.0 06/14/2024       Additional Labs:    09/2024  HLAB27 Positive  Uric acid 6.5  ESR 3 normal  CRP normal  RF negative  CCP negative    04/2023  Hepatitis B nonreactive  Hepatitis C nonreactive  TB negative    09/2022   RF negative  ESR 6 normal  CRP normal    Nallely Geronimo DO  [facility]  10/15/2024

## 2024-10-16 PROBLEM — M51.369 DEGENERATION OF INTERVERTEBRAL DISC OF LUMBAR REGION: Status: ACTIVE | Noted: 2024-10-16

## 2024-10-16 PROBLEM — M19.90 INFLAMMATORY ARTHRITIS: Status: ACTIVE | Noted: 2024-10-16

## 2024-10-16 PROBLEM — M25.50 POLYARTHRALGIA: Status: ACTIVE | Noted: 2024-10-16

## 2024-10-16 PROBLEM — Z15.89 HLA B27 (HLA B27 POSITIVE): Status: ACTIVE | Noted: 2024-10-16

## 2024-10-28 DIAGNOSIS — N52.9 ERECTILE DYSFUNCTION, UNSPECIFIED ERECTILE DYSFUNCTION TYPE: ICD-10-CM

## 2024-10-30 RX ORDER — TADALAFIL 5 MG/1
TABLET ORAL
Qty: 30 TABLET | Refills: 0 | OUTPATIENT
Start: 2024-10-30

## 2024-10-30 NOTE — TELEPHONE ENCOUNTER
Pt's last OV was on 8/7/23.  No future appts.   Refill denied.   [employer] message sent to pt.

## 2024-11-05 ENCOUNTER — PATIENT MESSAGE (OUTPATIENT)
Dept: FAMILY MEDICINE CLINIC | Facility: CLINIC | Age: 59
End: 2024-11-05

## 2024-11-05 DIAGNOSIS — N52.9 ERECTILE DYSFUNCTION, UNSPECIFIED ERECTILE DYSFUNCTION TYPE: ICD-10-CM

## 2024-11-05 RX ORDER — TADALAFIL 5 MG/1
TABLET ORAL
Qty: 30 TABLET | Refills: 0 | Status: SHIPPED | OUTPATIENT
Start: 2024-11-05

## 2024-11-29 ENCOUNTER — OFFICE VISIT (OUTPATIENT)
Dept: SURGERY | Facility: CLINIC | Age: 59
End: 2024-11-29
Payer: COMMERCIAL

## 2024-11-29 DIAGNOSIS — N13.8 BPH WITH OBSTRUCTION/LOWER URINARY TRACT SYMPTOMS: ICD-10-CM

## 2024-11-29 DIAGNOSIS — N30.90 CYSTITIS: Primary | ICD-10-CM

## 2024-11-29 DIAGNOSIS — N52.9 ERECTILE DYSFUNCTION, UNSPECIFIED ERECTILE DYSFUNCTION TYPE: ICD-10-CM

## 2024-11-29 DIAGNOSIS — N40.1 BPH WITH OBSTRUCTION/LOWER URINARY TRACT SYMPTOMS: ICD-10-CM

## 2024-11-29 DIAGNOSIS — L72.3 SCROTAL SEBACEOUS CYST: ICD-10-CM

## 2024-11-29 PROCEDURE — 99214 OFFICE O/P EST MOD 30 MIN: CPT | Performed by: UROLOGY

## 2024-11-29 RX ORDER — TADALAFIL 5 MG/1
5 TABLET ORAL DAILY
Qty: 90 TABLET | Refills: 5 | Status: SHIPPED | OUTPATIENT
Start: 2024-11-29

## 2024-11-29 NOTE — PROGRESS NOTES
HPI:     Chato Royal is a 59 year old male with a PMH of HL, psoriasis, LBP.     Following for:  1. ED  - 5 mg cialis prn  2. left epididymal cyst  3. Scrotal inclusion cysts  - s/p office removal 23  4. Fam h/o Cap  - dad dx with mCaP age 66  5. BPH/LUTS    PCP - Niko  LOV 23    He feels well. Has no residual scrotal bleeding following procedure last year. Went to ER right after the procedure was performed with bleeding.  Noted to have active oozing from the superior wound    AUA SS is  with 2 n; 1 w, I, f. Mostly happy with LUTS and declines meds.  Incontinence: none  Penoscrotal LOV: two inclusion cysts on left side of scrotum -> ~ 8 and 12 mm in size. Mobile, non-tender. Stable, soft ~ 2 cm epididymal cyst on left which is also non-tender. Also has two tiny (~ 1 mm) skin tags on left side of scrotum.  ELIEL deferred as he just had ELIEL with PCP but open to ELIEL NOV.    UA is negative    Gross hematuria: none  Tobacco hx: none  Kidney stone hx: none  Fam h/o  malignancy: dad  from mCaP (dx 66)    PSA 2.53 24    Scrotal US 22: 2.3 x 2.3 cm complex left epididymal head cyst, large left varicocele    Good potency with 5 mg cialis prn ([facility])    Will continue 5 mg cialis prn, observation for BPH/LUTS. Observation for urinary frequency. Can potentially f/u prn and have PCP refill cialis if he'd like or with me or PA in 1 y.     Prior note:  Presents with scrotal bleeding. We removed scrotal cysts on Friday. Is taking 81 mg ASA. Bleeding began ~ 4 hours after procedure.    HISTORY:  Past Medical History:    Glossitis    Glossodynia    High cholesterol    Hypercholesterolemia    Orchitis and epididymitis, unspecified    Psoriasis      Past Surgical History:   Procedure Laterality Date    Colonoscopy      normal    Femur/knee surg unlisted      arthroscopy knee left    Knee arthroscopy      Knee scope,med/lat menisectomy Left 2015    Procedure: ARTHROSCOPY LEFT KNEE AND  DEBRIDEMENT W/ MEDIAL MENISCECTOMY;  Surgeon: Octavio Seaman MD;  Location: [facility]    Other surgical history      injection of neurolytic substance epidural    Other surgical history  2013    right knee scope      Family History   Problem Relation Age of Onset    Cancer Father 66        Prostate    Other (Other) Mother         hearing loss    Other (aaa) Mother         AAA    Other (Other) Maternal Grandfather         liver cirrhosis    Other (Other) Paternal Grandfather         liver cirrhosis    Cancer Brother         skin ca    Heart Disorder Maternal Aunt         MI      Social History:   Social History     Socioeconomic History    Marital status:    Tobacco Use    Smoking status: Former     Types: Cigars    Smokeless tobacco: Never   Vaping Use    Vaping status: Never Used   Substance and Sexual Activity    Alcohol use: Yes     Comment: 3-5 times a week    Drug use: No    Sexual activity: Yes     Partners: Female   Other Topics Concern    Caffeine Concern No     Comment: 24 oz    Exercise Yes     Comment: regular     Seat Belt Yes        Medications (Active prior to today's visit):  Current Outpatient Medications   Medication Sig Dispense Refill    tadalafil (CIALIS) 5 MG Oral Tab Take 1 tablet (5 mg total) by mouth daily. 90 tablet 5    TADALAFIL 5 MG Oral Tab TAKE ONE TABLET BY MOUTH DAILY AS NEEDED. NO ADDITIONAL REFILLS UNTIL AFTER FOLLOW UP VISIT 30 tablet 0    diclofenac 50 MG Oral Tab EC Take 1 tablet (50 mg total) by mouth 2 (two) times daily with meals. 30 tablet 0    atorvastatin 20 MG Oral Tab TAKE 2 TABLETS BY MOUTH ON MONDAY, WEDNESDAY, AND FRIDAY NIGHTS AND TAKE 1 TABLET ON ALL OTHER DAYS 120 tablet 1    cimetidine 400 MG Oral Tab Take 1 tablet (400 mg total) by mouth 2 (two) times daily.      VTAMA 1 % External Cream Apply to affected area throughout body once daily.      fluticasone propionate 50 MCG/ACT Nasal Suspension 2 sprays by Nasal route daily. 16 g 3    aspirin 81 MG Oral  Tab EC Take 1 tablet (81 mg total) by mouth daily.      ibuprofen 200 MG Oral Tab Take 2 tablets (400 mg total) by mouth every 6 (six) hours as needed for Pain.      Multiple Vitamins-Minerals (MULTIVITAMIN ADULTS 50+) Oral Tab Take 1 tablet by mouth daily.         Allergies:  Allergies   Allergen Reactions    Mold     Pollen          ROS:     A comprehensive 10 point review of systems was completed.  Pertinent positives and negatives noted in the the HPI.    PHYSICAL EXAM:     GENERAL APPEARANCE: well, developed, well nourished, in no acute distress  NEUROLOGIC: nonfocal, alert and oriented  HEAD: normocephalic, atraumatic  EYES: sclera non-icteric  EARS: hearing intact  ORAL CAVITY: mucosa moist  NECK/THYROID: no obvious goiter or masses  LUNGS: nonlabored breathing  ABDOMEN: soft, no obvious masses or tenderness  SKIN: no obvious rashes    : as noted above    ASSESSMENT/PLAN:   Diagnoses and all orders for this visit:    Cystitis    BPH with obstruction/lower urinary tract symptoms  -     tadalafil (CIALIS) 5 MG Oral Tab; Take 1 tablet (5 mg total) by mouth daily.    Scrotal sebaceous cyst    Erectile dysfunction, unspecified erectile dysfunction type    - as noted above.    Thanks again for this consult.      Chase Juan MD, FACS  Urologist  [facility]  Office: [phone]

## 2024-12-03 DIAGNOSIS — E78.00 PURE HYPERCHOLESTEROLEMIA: ICD-10-CM

## 2024-12-03 RX ORDER — ATORVASTATIN CALCIUM 20 MG/1
TABLET, FILM COATED ORAL
Qty: 120 TABLET | Refills: 0 | Status: SHIPPED | OUTPATIENT
Start: 2024-12-03

## 2024-12-03 NOTE — TELEPHONE ENCOUNTER
LOV: 6/26/2024  for: CPX  Patient advised to RTC on:  6 months.   Medication Quantity Refills Start End   atorvastatin 20 MG Oral Tab 120 tablet 1 7/10/2024 --   Sig:   TAKE 2 TABLETS BY MOUTH ON MONDAY, WEDNESDAY, AND FRIDAY NIGHTS AND TAKE 1 TABLET ON ALL OTHER DAYS

## 2024-12-11 RX ORDER — TAPINAROF 10 MG/1000MG
CREAM TOPICAL
Refills: 0 | OUTPATIENT
Start: 2024-12-11

## 2025-01-24 ENCOUNTER — OFFICE VISIT (OUTPATIENT)
Dept: FAMILY MEDICINE CLINIC | Facility: CLINIC | Age: 60
End: 2025-01-24
Payer: COMMERCIAL

## 2025-01-24 VITALS
RESPIRATION RATE: 20 BRPM | BODY MASS INDEX: 27.24 KG/M2 | HEART RATE: 83 BPM | DIASTOLIC BLOOD PRESSURE: 94 MMHG | SYSTOLIC BLOOD PRESSURE: 158 MMHG | TEMPERATURE: 97 F | WEIGHT: 183.88 LBS | HEIGHT: 69 IN | OXYGEN SATURATION: 97 %

## 2025-01-24 DIAGNOSIS — R03.0 ELEVATED BLOOD PRESSURE READING: ICD-10-CM

## 2025-01-24 DIAGNOSIS — J01.00 ACUTE NON-RECURRENT MAXILLARY SINUSITIS: Primary | ICD-10-CM

## 2025-01-24 PROCEDURE — 99213 OFFICE O/P EST LOW 20 MIN: CPT | Performed by: PHYSICIAN ASSISTANT

## 2025-01-24 RX ORDER — FLUTICASONE PROPIONATE 50 MCG
1 SPRAY, SUSPENSION (ML) NASAL 2 TIMES DAILY
Qty: 1 EACH | Refills: 0 | Status: SHIPPED | OUTPATIENT
Start: 2025-01-24 | End: 2025-02-23

## 2025-01-24 NOTE — PROGRESS NOTES
CHIEF COMPLAINT:     Chief Complaint   Patient presents with    Cold     Cold for past 6 days   Now also feels like a sinus infection  Really bad headache no fever but achy  Taking sudafed       HPI:   Chato Royal is a 60 year old male who presents for cold symptoms 6-7 days.  Worsening sinus pressure and thick congestion. No fever. + body aches/chills on and off. + headache. + nasal congestion. + sinus pressure. + ear popping, no pain. No sore throat. No cough. No chest pain or SOB. no GI symptoms. No covid at home testing. Taking sudafed OTC. No hx of HTN. Hx of sinus infections        Current Outpatient Medications   Medication Sig Dispense Refill    amoxicillin clavulanate 875-125 MG Oral Tab Take 1 tablet by mouth 2 (two) times daily for 7 days. 14 tablet 0    fluticasone propionate 50 MCG/ACT Nasal Suspension 1 spray by Each Nare route in the morning and 1 spray before bedtime. 1 each 0    atorvastatin 20 MG Oral Tab TAKE 2 TABLETS BY MOUTH at night ON MONDAY, WEDNESDAY AND FRIDAY. Take 1 tablet once daily all other days. 120 tablet 0    tadalafil (CIALIS) 5 MG Oral Tab Take 1 tablet (5 mg total) by mouth daily. 90 tablet 5    TADALAFIL 5 MG Oral Tab TAKE ONE TABLET BY MOUTH DAILY AS NEEDED. NO ADDITIONAL REFILLS UNTIL AFTER FOLLOW UP VISIT 30 tablet 0    diclofenac 50 MG Oral Tab EC Take 1 tablet (50 mg total) by mouth 2 (two) times daily with meals. 30 tablet 0    cimetidine 400 MG Oral Tab Take 1 tablet (400 mg total) by mouth 2 (two) times daily.      VTAMA 1 % External Cream Apply to affected area throughout body once daily.      aspirin 81 MG Oral Tab EC Take 1 tablet (81 mg total) by mouth daily.      ibuprofen 200 MG Oral Tab Take 2 tablets (400 mg total) by mouth every 6 (six) hours as needed for Pain.      Multiple Vitamins-Minerals (MULTIVITAMIN ADULTS 50+) Oral Tab Take 1 tablet by mouth daily.      fluticasone propionate 50 MCG/ACT Nasal Suspension 2 sprays by Nasal route daily. (Patient not  taking: Reported on 1/24/2025) 16 g 3      Past Medical History:    Glossitis    Glossodynia    High cholesterol    Hypercholesterolemia    Orchitis and epididymitis, unspecified    Psoriasis      Past Surgical History:   Procedure Laterality Date    Colonoscopy  2009    normal    Femur/knee surg unlisted      arthroscopy knee left    Knee arthroscopy      Knee scope,med/lat menisectomy Left 1/14/2015    Procedure: ARTHROSCOPY LEFT KNEE AND DEBRIDEMENT W/ MEDIAL MENISCECTOMY;  Surgeon: Octavio Seaman MD;  Location: [facility]    Other surgical history      injection of neurolytic substance epidural    Other surgical history  2013    right knee scope      Family History   Problem Relation Age of Onset    Cancer Father 66        Prostate    Other (Other) Mother         hearing loss    Other (aaa) Mother         AAA    Other (Other) Maternal Grandfather         liver cirrhosis    Other (Other) Paternal Grandfather         liver cirrhosis    Cancer Brother         skin ca    Heart Disorder Maternal Aunt         MI      Social History     Socioeconomic History    Marital status:    Tobacco Use    Smoking status: Former     Types: Cigars    Smokeless tobacco: Never   Vaping Use    Vaping status: Never Used   Substance and Sexual Activity    Alcohol use: Yes     Comment: 3-5 times a week    Drug use: No    Sexual activity: Yes     Partners: Female   Other Topics Concern    Caffeine Concern No     Comment: 24 oz    Exercise Yes     Comment: regular     Seat Belt Yes         REVIEW OF SYSTEMS:   GENERAL:  denies  diminished appetite  SKIN: no rashes or abnormal skin lesions  HEENT: See HPI.    LUNGS: denies shortness of breath or wheezing, See HPI  CARDIOVASCULAR: denies chest pain or palpitations   GI: denies N/V/C or abdominal pain  NEURO: + sinus headaches.  No numbness or tingling in face.    EXAM:   BP (!) 158/94   Pulse 83   Temp 97.1 °F (36.2 °C) (Temporal)   Resp 20   Ht 5' 9\" (1.753 m)   Wt 183 lb  13.8 oz (83.4 kg)   SpO2 97%   BMI 27.15 kg/m²   Physical Exam  Constitutional:       General: He is not in acute distress.     Appearance: Normal appearance.   HENT:      Head: Normocephalic.      Right Ear: Tympanic membrane, ear canal and external ear normal.      Left Ear: Tympanic membrane, ear canal and external ear normal.      Nose: Rhinorrhea present.      Right Turbinates: Enlarged.      Left Turbinates: Enlarged.      Right Sinus: Maxillary sinus tenderness present.      Left Sinus: Maxillary sinus tenderness present.      Mouth/Throat:      Mouth: Mucous membranes are moist.      Pharynx: Oropharynx is clear. Uvula midline. Posterior oropharyngeal erythema (post nasal drip) present.      Tonsils: No tonsillar exudate.   Eyes:      Conjunctiva/sclera: Conjunctivae normal.      Pupils: Pupils are equal, round, and reactive to light.   Cardiovascular:      Rate and Rhythm: Normal rate and regular rhythm.      Heart sounds: Normal heart sounds. No murmur heard.  Pulmonary:      Effort: Pulmonary effort is normal. No respiratory distress.      Breath sounds: Normal breath sounds. No wheezing or rhonchi.   Chest:      Chest wall: No tenderness.   Musculoskeletal:      Cervical back: Normal range of motion and neck supple.   Lymphadenopathy:      Cervical: No cervical adenopathy.   Skin:     General: Skin is warm.      Findings: No rash.   Neurological:      Mental Status: He is alert and oriented to person, place, and time.          No results found for this or any previous visit (from the past 24 hours).    ASSESSMENT AND PLAN:   Chato Royal is a 60 year old male who presents with URI symptoms that are consistent with:      ASSESSMENT:  Encounter Diagnoses   Name Primary?    Acute non-recurrent maxillary sinusitis Yes    Elevated blood pressure reading          PLAN: Meds as below.  Comfort care instructions as listed in Patient Instructions    Meds & Refills for this Visit:  Requested Prescriptions      Signed Prescriptions Disp Refills    amoxicillin clavulanate 875-125 MG Oral Tab 14 tablet 0     Sig: Take 1 tablet by mouth 2 (two) times daily for 7 days.    fluticasone propionate 50 MCG/ACT Nasal Suspension 1 each 0     Si spray by Each Nare route in the morning and 1 spray before bedtime.       Risks, benefits, side effects of medication addressed and explained.    Patient Instructions   Stop Sudafed OTC   Rest   Push fluids   Tylenol OTC as needed for pain/fever   Flonase 1 spray each nostril twice daily for sinus pressure   Blood pressure elevated in office. Monitor at home. Close PCP follow up   Please follow up with PCP if no improvement or if symptoms worsen      The patient indicates understanding of these issues and agrees to the plan.  The patient is asked to f/u with PCP if sx's persist or worsen.

## 2025-01-24 NOTE — PATIENT INSTRUCTIONS
Stop Sudafed OTC   Rest   Push fluids   Tylenol OTC as needed for pain/fever   Flonase 1 spray each nostril twice daily for sinus pressure   Blood pressure elevated in office. Monitor at home. Close PCP follow up   Please follow up with PCP if no improvement or if symptoms worsen

## 2025-01-31 RX ORDER — CIMETIDINE 400 MG
400 TABLET ORAL 2 TIMES DAILY
Refills: 0 | OUTPATIENT
Start: 2025-01-31

## 2025-02-27 NOTE — TELEPHONE ENCOUNTER
Hypothermia, likely secondary to sepsis  given 50mg solucortef in the ED, cortisol level unfortunately obtained after hydrocortisone administration   TSH 9, free T4 pending   Ruben hugger to achieve normothermia   Tadalafil 10 Mg Tab Teva    Non protocol medication. Please see pended medications. Please sign if appropriate.       Thank you      Last OV: Physical on 02/14/2020

## 2025-05-05 NOTE — PROGRESS NOTES
HPI:     Clara Bravo is a 59year old male with a PMH of HL, psoriasis, LBP. Following for:  1. ED  - 5 mg cialis prn  2. left epididymal cyst  3. Scrotal inclusion cysts  4. Fam h/o Cap  - dad dx with mCaP age 76  10. BPH/LUTS    PCP - Straczysnki  LOV 22    Presents for check-up. Scrotal cysts are slightly enlarged and bothersome. First noted when he was a teen but has slowly enlarged. Non-tender. No drainage. AUA SS is  with 2 n; 1 w, I, f. Mostly happy with LUTS and declines meds. Incontinence: none  Penoscrotal: two inclusion cysts on left side of scrotum -> ~ 8 and 12 mm in size. Mobile, non-tender. Stable, soft ~ 2 cm epididymal cyst on left which is also non-tender. Also has two tiny (~ 1 mm) skin tags on left side of scrotum. ELIEL deferred. UA is negative    Gross hematuria: none  Tobacco hx: none  Kidney stone hx: none  Fam h/o  malignancy: dad  from [address] (dx 77)    PSA 2.15 23    Scrotal US 22: 2.3 x 2.3 cm complex left epididymal head cyst, large left varicocele    Good potency with 5 mg cialis prn ([place])    Discussed options and he would like to proceed with removal of scrotal wall cyst x 2 as well as possible removal of skin tag x 2 (pt will think about this and let me know if he wants me to remove the skin tags). We discussed the risks and benefits to the procedure including, but not limited to, bleeding, infection, possible damage to surrounding structures. The patient understands and would like to proceed. Will continue 5 mg cialis prn, observation for BPH/LUTS. Removal of scrotal wall cyst x 2 and possibly skin tag x 2.     HISTORY:  Past Medical History:   Diagnosis Date    Glossitis     Glossodynia     High cholesterol     Hypercholesterolemia     Orchitis and epididymitis, unspecified     Psoriasis       Past Surgical History:   Procedure Laterality Date    COLONOSCOPY      normal    FEMUR/KNEE SURG UNLISTED      arthroscopy knee left    KNEE Sleepy during the day and agitated at night.  Ngt placed.  Will transition off Precedex. Consider seroquel  Agitation worse around late afternoon and evening.  I suspect a component of sundowning compounded by rib pain.       ARTHROSCOPY      KNEE SCOPE,MED/LAT MENISECTOMY Left 1/14/2015    Procedure: ARTHROSCOPY LEFT KNEE AND DEBRIDEMENT W/ MEDIAL MENISCECTOMY;  Surgeon: Darrick Sotelo MD;  [facility]    OTHER SURGICAL HISTORY      injection of neurolytic substance epidural    OTHER SURGICAL HISTORY  2013    right knee scope      Family History   Problem Relation Age of Onset    Cancer Father 77        Prostate    Other (Other) Maternal Grandfather         liver cirrhosis    Other (Other) Paternal Grandfather         liver cirrhosis    Other (Other) Mother         hearing loss    Other (aaa) Mother         AAA    Heart Disorder Maternal Aunt         MI      Social History:   Social History     Socioeconomic History    Marital status:    Tobacco Use    Smoking status: Former     Types: Cigars    Smokeless tobacco: Never   Vaping Use    Vaping Use: Never used   Substance and Sexual Activity    Alcohol use: Yes     Alcohol/week: 0.0 standard drinks of alcohol     Comment: 3 times a week    Drug use: No    Sexual activity: Yes     Partners: Female   Other Topics Concern    Caffeine Concern No     Comment: 24 oz    Exercise Yes     Comment: regular     Seat Belt Yes        Medications (Active prior to today's visit):  Current Outpatient Medications   Medication Sig Dispense Refill    diazePAM (VALIUM) 10 MG Oral Tab Take 1 tablet (10 mg total) by mouth See Admin Instructions. Take 1-2 tablets by mouth 20-30 minutes before procedure. 2 tablet 0    traMADol 50 MG Oral Tab Take 1 tablet (50 mg total) by mouth every 6 (six) hours as needed for Pain. 15 tablet 0    tadalafil 5 MG Oral Tab TAKE ONE TABLET BY MOUTH DAILY AS NEEDED. NO ADDITIONAL REFILLS UNTIL AFTER FOLLOW UP VISIT 30 tablet 11    atorvastatin 20 MG Oral Tab Take 2 tablets by mouth nightly on Monday, Wednesday, Friday and 1 tablet nightly all other days 120 tablet 1    cimetidine 400 MG Oral Tab Take 1 tablet (400 mg total) by mouth 2 (two) times daily. VTAMA 1 % External Cream Apply to affected area throughout body once daily. fluticasone propionate 50 MCG/ACT Nasal Suspension 2 sprays by Nasal route daily. 16 g 3    aspirin 81 MG Oral Tab EC Take 1 tablet (81 mg total) by mouth daily. ibuprofen 200 MG Oral Tab Take 2 tablets (400 mg total) by mouth every 6 (six) hours as needed for Pain. Multiple Vitamins-Minerals (MULTIVITAMIN ADULTS 50+) Oral Tab Take 1 tablet by mouth daily. Allergies:    Mold                      Pollen                        ROS:     A comprehensive 10 point review of systems was completed. Pertinent positives and negatives noted in the the HPI. PHYSICAL EXAM:     GENERAL APPEARANCE: well, developed, well nourished, in no acute distress  NEUROLOGIC: nonfocal, alert and oriented  HEAD: normocephalic, atraumatic  EYES: sclera non-icteric  EARS: hearing intact  ORAL CAVITY: mucosa moist  NECK/THYROID: no obvious goiter or masses  LUNGS: nonlabored breathing  ABDOMEN: soft, no obvious masses or tenderness  SKIN: no obvious rashes    : as noted above    ASSESSMENT/PLAN:   Diagnoses and all orders for this visit:    Erectile dysfunction, unspecified erectile dysfunction type  -     URINALYSIS, AUTO, W/O SCOPE  -     tadalafil 5 MG Oral Tab; TAKE ONE TABLET BY MOUTH DAILY AS NEEDED. NO ADDITIONAL REFILLS UNTIL AFTER FOLLOW UP VISIT    Scrotal sebaceous cyst  -     URINALYSIS, AUTO, W/O SCOPE  -     diazePAM (VALIUM) 10 MG Oral Tab; Take 1 tablet (10 mg total) by mouth See Admin Instructions. Take 1-2 tablets by mouth 20-30 minutes before procedure. -     traMADol 50 MG Oral Tab; Take 1 tablet (50 mg total) by mouth every 6 (six) hours as needed for Pain. BPH with obstruction/lower urinary tract symptoms    Epididymal cyst      - as noted above. Thanks again for this consult.       Sherry Amos MD, Dmitry Irwin  Urologist  [facility]  Office: [phone]

## 2025-07-11 ENCOUNTER — PATIENT MESSAGE (OUTPATIENT)
Dept: FAMILY MEDICINE CLINIC | Facility: CLINIC | Age: 60
End: 2025-07-11

## 2025-07-11 DIAGNOSIS — Z12.5 SCREENING FOR PROSTATE CANCER: ICD-10-CM

## 2025-07-11 DIAGNOSIS — Z00.00 LABORATORY EXAM ORDERED AS PART OF ROUTINE GENERAL MEDICAL EXAMINATION: Primary | ICD-10-CM

## 2025-07-11 DIAGNOSIS — E78.00 PURE HYPERCHOLESTEROLEMIA: ICD-10-CM

## 2025-07-16 ENCOUNTER — RESULTS FOLLOW-UP (OUTPATIENT)
Dept: FAMILY MEDICINE CLINIC | Facility: CLINIC | Age: 60
End: 2025-07-16

## 2025-07-16 ENCOUNTER — LAB ENCOUNTER (OUTPATIENT)
Dept: LAB | Age: 60
End: 2025-07-16
Attending: FAMILY MEDICINE
Payer: COMMERCIAL

## 2025-07-16 DIAGNOSIS — R73.9 HYPERGLYCEMIA: ICD-10-CM

## 2025-07-16 DIAGNOSIS — E78.00 PURE HYPERCHOLESTEROLEMIA: ICD-10-CM

## 2025-07-16 DIAGNOSIS — Z00.00 LABORATORY EXAM ORDERED AS PART OF ROUTINE GENERAL MEDICAL EXAMINATION: ICD-10-CM

## 2025-07-16 DIAGNOSIS — Z12.5 SCREENING FOR PROSTATE CANCER: ICD-10-CM

## 2025-07-16 DIAGNOSIS — R73.9 HYPERGLYCEMIA: Primary | ICD-10-CM

## 2025-07-16 LAB
ALBUMIN SERPL-MCNC: 5 G/DL (ref 3.2–4.8)
ALBUMIN/GLOB SERPL: 1.9 {RATIO} (ref 1–2)
ALP LIVER SERPL-CCNC: 66 U/L (ref 45–117)
ALT SERPL-CCNC: 23 U/L (ref 10–49)
ANION GAP SERPL CALC-SCNC: 3 MMOL/L (ref 0–18)
AST SERPL-CCNC: 23 U/L (ref ?–34)
BASOPHILS # BLD AUTO: 0.06 X10(3) UL (ref 0–0.2)
BASOPHILS NFR BLD AUTO: 1 %
BILIRUB SERPL-MCNC: 0.5 MG/DL (ref 0.2–1.1)
BILIRUB UR QL STRIP.AUTO: NEGATIVE
BUN BLD-MCNC: 15 MG/DL (ref 9–23)
CALCIUM BLD-MCNC: 9.9 MG/DL (ref 8.7–10.6)
CHLORIDE SERPL-SCNC: 104 MMOL/L (ref 98–112)
CHOLEST SERPL-MCNC: 181 MG/DL (ref ?–200)
CLARITY UR REFRACT.AUTO: CLEAR
CO2 SERPL-SCNC: 32 MMOL/L (ref 21–32)
COMPLEXED PSA SERPL-MCNC: 2.09 NG/ML (ref ?–4)
CREAT BLD-MCNC: 1.07 MG/DL (ref 0.7–1.3)
EGFRCR SERPLBLD CKD-EPI 2021: 78 ML/MIN/1.73M2 (ref 60–?)
EOSINOPHIL # BLD AUTO: 0.24 X10(3) UL (ref 0–0.7)
EOSINOPHIL NFR BLD AUTO: 3.8 %
ERYTHROCYTE [DISTWIDTH] IN BLOOD BY AUTOMATED COUNT: 12.6 %
EST. AVERAGE GLUCOSE BLD GHB EST-MCNC: 108 MG/DL (ref 68–126)
FASTING PATIENT LIPID ANSWER: YES
FASTING STATUS PATIENT QL REPORTED: YES
GLOBULIN PLAS-MCNC: 2.7 G/DL (ref 2–3.5)
GLUCOSE BLD-MCNC: 105 MG/DL (ref 70–99)
GLUCOSE UR STRIP.AUTO-MCNC: NORMAL MG/DL
HBA1C MFR BLD: 5.4 % (ref ?–5.7)
HCT VFR BLD AUTO: 43.6 % (ref 39–53)
HDLC SERPL-MCNC: 78 MG/DL (ref 40–59)
HGB BLD-MCNC: 14.5 G/DL (ref 13–17.5)
IMM GRANULOCYTES # BLD AUTO: 0.05 X10(3) UL (ref 0–1)
IMM GRANULOCYTES NFR BLD: 0.8 %
KETONES UR STRIP.AUTO-MCNC: NEGATIVE MG/DL
LDLC SERPL CALC-MCNC: 92 MG/DL (ref ?–100)
LEUKOCYTE ESTERASE UR QL STRIP.AUTO: NEGATIVE
LYMPHOCYTES # BLD AUTO: 1.76 X10(3) UL (ref 1–4)
LYMPHOCYTES NFR BLD AUTO: 28.2 %
MCH RBC QN AUTO: 30.5 PG (ref 26–34)
MCHC RBC AUTO-ENTMCNC: 33.3 G/DL (ref 31–37)
MCV RBC AUTO: 91.6 FL (ref 80–100)
MONOCYTES # BLD AUTO: 0.56 X10(3) UL (ref 0.1–1)
MONOCYTES NFR BLD AUTO: 9 %
NEUTROPHILS # BLD AUTO: 3.57 X10 (3) UL (ref 1.5–7.7)
NEUTROPHILS # BLD AUTO: 3.57 X10(3) UL (ref 1.5–7.7)
NEUTROPHILS NFR BLD AUTO: 57.2 %
NITRITE UR QL STRIP.AUTO: NEGATIVE
NONHDLC SERPL-MCNC: 103 MG/DL (ref ?–130)
OSMOLALITY SERPL CALC.SUM OF ELEC: 289 MOSM/KG (ref 275–295)
PH UR STRIP.AUTO: 5 [PH] (ref 5–8)
PLATELET # BLD AUTO: 195 10(3)UL (ref 150–450)
POTASSIUM SERPL-SCNC: 4.6 MMOL/L (ref 3.5–5.1)
PROT SERPL-MCNC: 7.7 G/DL (ref 5.7–8.2)
PROT UR STRIP.AUTO-MCNC: NEGATIVE MG/DL
RBC # BLD AUTO: 4.76 X10(6)UL (ref 4.3–5.7)
RBC UR QL AUTO: NEGATIVE
SODIUM SERPL-SCNC: 139 MMOL/L (ref 136–145)
SP GR UR STRIP.AUTO: 1.02 (ref 1–1.03)
TRIGL SERPL-MCNC: 58 MG/DL (ref 30–149)
TSI SER-ACNC: 0.89 UIU/ML (ref 0.55–4.78)
UROBILINOGEN UR STRIP.AUTO-MCNC: NORMAL MG/DL
VLDLC SERPL CALC-MCNC: 9 MG/DL (ref 0–30)
WBC # BLD AUTO: 6.2 X10(3) UL (ref 4–11)

## 2025-07-16 PROCEDURE — 84443 ASSAY THYROID STIM HORMONE: CPT

## 2025-07-16 PROCEDURE — 83036 HEMOGLOBIN GLYCOSYLATED A1C: CPT

## 2025-07-16 PROCEDURE — 81003 URINALYSIS AUTO W/O SCOPE: CPT

## 2025-07-16 PROCEDURE — 80061 LIPID PANEL: CPT

## 2025-07-16 PROCEDURE — 80053 COMPREHEN METABOLIC PANEL: CPT

## 2025-07-16 PROCEDURE — 85025 COMPLETE CBC W/AUTO DIFF WBC: CPT

## 2025-07-16 PROCEDURE — 36415 COLL VENOUS BLD VENIPUNCTURE: CPT

## 2025-07-28 RX ORDER — TRIAMCINOLONE ACETONIDE 1 MG/G
CREAM TOPICAL
COMMUNITY
Start: 2025-03-25

## 2025-07-29 ENCOUNTER — OFFICE VISIT (OUTPATIENT)
Dept: FAMILY MEDICINE CLINIC | Facility: CLINIC | Age: 60
End: 2025-07-29
Payer: COMMERCIAL

## 2025-07-29 VITALS
HEIGHT: 68 IN | SYSTOLIC BLOOD PRESSURE: 138 MMHG | WEIGHT: 175 LBS | BODY MASS INDEX: 26.52 KG/M2 | RESPIRATION RATE: 14 BRPM | TEMPERATURE: 98 F | OXYGEN SATURATION: 98 % | HEART RATE: 82 BPM | DIASTOLIC BLOOD PRESSURE: 82 MMHG

## 2025-07-29 DIAGNOSIS — L40.9 PSORIASIS: ICD-10-CM

## 2025-07-29 DIAGNOSIS — M25.512 LEFT SHOULDER PAIN, UNSPECIFIED CHRONICITY: ICD-10-CM

## 2025-07-29 DIAGNOSIS — Z00.00 PHYSICAL EXAM, ANNUAL: Primary | ICD-10-CM

## 2025-07-29 DIAGNOSIS — E78.00 PURE HYPERCHOLESTEROLEMIA: ICD-10-CM

## 2025-07-29 DIAGNOSIS — M79.602 PAIN OF LEFT UPPER EXTREMITY: ICD-10-CM

## 2025-07-29 DIAGNOSIS — R73.9 HYPERGLYCEMIA: ICD-10-CM

## 2025-07-29 DIAGNOSIS — R12 HEARTBURN: ICD-10-CM

## 2025-07-29 DIAGNOSIS — R41.840 ATTENTION DEFICIT: ICD-10-CM

## 2025-07-29 PROCEDURE — 99396 PREV VISIT EST AGE 40-64: CPT | Performed by: FAMILY MEDICINE

## 2025-07-29 PROCEDURE — 99214 OFFICE O/P EST MOD 30 MIN: CPT | Performed by: FAMILY MEDICINE

## 2025-07-29 RX ORDER — ATORVASTATIN CALCIUM 20 MG/1
20 TABLET, FILM COATED ORAL DAILY
Qty: 90 TABLET | Refills: 1 | Status: SHIPPED | OUTPATIENT
Start: 2025-07-29

## 2025-07-30 ENCOUNTER — HOSPITAL ENCOUNTER (OUTPATIENT)
Dept: GENERAL RADIOLOGY | Age: 60
Discharge: HOME | End: 2025-07-30
Attending: FAMILY MEDICINE

## 2025-07-30 DIAGNOSIS — M25.512 LEFT SHOULDER PAIN, UNSPECIFIED CHRONICITY: ICD-10-CM

## 2025-07-30 DIAGNOSIS — M79.602 PAIN OF LEFT UPPER EXTREMITY: ICD-10-CM

## 2025-07-30 PROCEDURE — 73030 X-RAY EXAM OF SHOULDER: CPT | Performed by: FAMILY MEDICINE

## 2025-08-04 ENCOUNTER — TELEPHONE (OUTPATIENT)
Dept: ORTHOPEDICS CLINIC | Facility: CLINIC | Age: 60
End: 2025-08-04

## 2025-08-13 ENCOUNTER — OFFICE VISIT (OUTPATIENT)
Dept: ORTHOPEDICS CLINIC | Facility: CLINIC | Age: 60
End: 2025-08-13

## 2025-08-13 VITALS — WEIGHT: 178 LBS | BODY MASS INDEX: 24.92 KG/M2 | HEIGHT: 70.8 IN

## 2025-08-13 DIAGNOSIS — M75.82 TENDINITIS OF LEFT ROTATOR CUFF: ICD-10-CM

## 2025-08-13 DIAGNOSIS — S46.002A INJURY OF LEFT ROTATOR CUFF, INITIAL ENCOUNTER: Primary | ICD-10-CM

## 2025-08-13 PROCEDURE — 99204 OFFICE O/P NEW MOD 45 MIN: CPT | Performed by: ORTHOPAEDIC SURGERY

## (undated) DIAGNOSIS — E78.00 PURE HYPERCHOLESTEROLEMIA: Primary | ICD-10-CM

## (undated) NOTE — MR AVS SNAPSHOT
[facility], [address] [id number] [phone]               Thank you for choosing us for your health care visit with Amy Wilkins MD.  We are glad to serve you and happy to provide you with this manzano These medications were sent to [facility], [address] [facility]  [address] [phone], [phone]  [address], [address]     Phone:  [phone]    - methylPREDNISolone 4 MG Tbpk            Today's Orders     Rapid Strep Start activities slowly and build up over time Do what you like   Get your heart pumping – brisk walking, biking, swimming Even 10 minute increments are effective and add up over the week   2 ½ hours per week – spread out over time Use a francoise to keep you

## (undated) NOTE — MR AVS SNAPSHOT
[facility], [address] [id number] [phone]               Thank you for choosing us for your health care visit with Madison Rothman MD.  We are glad to serve you and happy to provide you with this manzano [facility] in [facility] and [employer]  [address] [facility], [address] [place] [address], [address] [place] [address] Rd    [address]: [phone]    [facility], [address] [place] [address] Current Medications          This list is accurate as of: 5/6/17 10:33 AM.  Always use your most recent med list.                Atorvastatin Calcium 20 MG Tabs   TAKE ONE TABLET BY MOUTH NIGHTLY   Commonly known as:  LIPITOR           azithromycin 250 MG

## (undated) NOTE — Clinical Note
Dr. Graciela Hanson. And update on the work up for Chato. We will be monitoring his symptoms going forward.  Thanks! - Nallely

## (undated) NOTE — Clinical Note
Hi, Dr. Owens!Thank you for referring . Chato Royal for rheumatologic evaluation. Please see the discussion portion of my note and let me know if you have any questions.   Nallely Geronimo, DO [facility] 9/12/2024